# Patient Record
Sex: FEMALE | Race: WHITE | NOT HISPANIC OR LATINO | Employment: FULL TIME | URBAN - METROPOLITAN AREA
[De-identification: names, ages, dates, MRNs, and addresses within clinical notes are randomized per-mention and may not be internally consistent; named-entity substitution may affect disease eponyms.]

---

## 2017-01-03 ENCOUNTER — PRENATAL OFFICE VISIT - HEALTHEAST (OUTPATIENT)
Dept: MIDWIFE SERVICES | Facility: CLINIC | Age: 32
End: 2017-01-03

## 2017-01-03 DIAGNOSIS — Z34.83 ENCOUNTER FOR SUPERVISION OF OTHER NORMAL PREGNANCY, THIRD TRIMESTER: ICD-10-CM

## 2017-01-03 ASSESSMENT — MIFFLIN-ST. JEOR: SCORE: 1636.17

## 2017-01-12 ENCOUNTER — HOME CARE/HOSPICE - HEALTHEAST (OUTPATIENT)
Dept: HOME HEALTH SERVICES | Facility: HOME HEALTH | Age: 32
End: 2017-01-12

## 2017-01-13 ENCOUNTER — COMMUNICATION - HEALTHEAST (OUTPATIENT)
Dept: OBGYN | Facility: CLINIC | Age: 32
End: 2017-01-13

## 2017-01-16 ENCOUNTER — COMMUNICATION - HEALTHEAST (OUTPATIENT)
Dept: ADMINISTRATIVE | Facility: CLINIC | Age: 32
End: 2017-01-16

## 2017-01-17 ENCOUNTER — OFFICE VISIT - HEALTHEAST (OUTPATIENT)
Dept: MIDWIFE SERVICES | Facility: CLINIC | Age: 32
End: 2017-01-17

## 2017-01-17 DIAGNOSIS — N89.8 VAGINAL ODOR: ICD-10-CM

## 2017-01-17 DIAGNOSIS — Z78.9 BREASTFEEDING (INFANT): ICD-10-CM

## 2017-01-17 DIAGNOSIS — B99.9 INFECTION: ICD-10-CM

## 2017-01-17 ASSESSMENT — MIFFLIN-ST. JEOR: SCORE: 1542.28

## 2017-01-18 ENCOUNTER — COMMUNICATION - HEALTHEAST (OUTPATIENT)
Dept: ADMINISTRATIVE | Facility: CLINIC | Age: 32
End: 2017-01-18

## 2017-01-18 ENCOUNTER — AMBULATORY - HEALTHEAST (OUTPATIENT)
Dept: MIDWIFE SERVICES | Facility: CLINIC | Age: 32
End: 2017-01-18

## 2017-01-18 ENCOUNTER — COMMUNICATION - HEALTHEAST (OUTPATIENT)
Dept: OBGYN | Facility: CLINIC | Age: 32
End: 2017-01-18

## 2017-01-23 ENCOUNTER — COMMUNICATION - HEALTHEAST (OUTPATIENT)
Dept: OBGYN | Facility: CLINIC | Age: 32
End: 2017-01-23

## 2017-02-20 ENCOUNTER — OFFICE VISIT - HEALTHEAST (OUTPATIENT)
Dept: MIDWIFE SERVICES | Facility: CLINIC | Age: 32
End: 2017-02-20

## 2017-02-20 DIAGNOSIS — R58 BLEEDING: ICD-10-CM

## 2017-02-20 ASSESSMENT — MIFFLIN-ST. JEOR: SCORE: 1549.53

## 2017-02-23 LAB
HPV INTERPRETATION - HISTORICAL: ABNORMAL
HPV INTERPRETER - HISTORICAL: ABNORMAL

## 2017-02-24 ENCOUNTER — AMBULATORY - HEALTHEAST (OUTPATIENT)
Dept: OBGYN | Facility: CLINIC | Age: 32
End: 2017-02-24

## 2017-02-24 LAB
BKR LAB AP ABNORMAL BLEEDING: NO
BKR LAB AP BIRTH CONTROL/HORMONES: NORMAL
BKR LAB AP CERVICAL APPEARANCE: NORMAL
BKR LAB AP GYN ADEQUACY: NORMAL
BKR LAB AP GYN INTERPRETATION: NORMAL
BKR LAB AP HPV REFLEX: NORMAL
BKR LAB AP LMP: NORMAL
BKR LAB AP PATIENT STATUS: NORMAL
BKR LAB AP PREVIOUS ABNORMAL: NORMAL
BKR LAB AP PREVIOUS NORMAL: NORMAL
HIGH RISK?: YES
PATH REPORT.COMMENTS IMP SPEC: NORMAL
RESULT FLAG (HE HISTORICAL CONVERSION): NORMAL

## 2017-02-27 ENCOUNTER — COMMUNICATION - HEALTHEAST (OUTPATIENT)
Dept: MIDWIFE SERVICES | Facility: CLINIC | Age: 32
End: 2017-02-27

## 2017-03-06 ENCOUNTER — TRANSFERRED RECORDS (OUTPATIENT)
Dept: HEALTH INFORMATION MANAGEMENT | Facility: CLINIC | Age: 32
End: 2017-03-06

## 2017-03-06 LAB — PAP SMEAR - HIM PATIENT REPORTED: NEGATIVE

## 2017-04-07 ENCOUNTER — OFFICE VISIT (OUTPATIENT)
Dept: FAMILY MEDICINE | Facility: CLINIC | Age: 32
End: 2017-04-07
Payer: COMMERCIAL

## 2017-04-07 VITALS
HEART RATE: 101 BPM | BODY MASS INDEX: 29.89 KG/M2 | RESPIRATION RATE: 20 BRPM | HEIGHT: 66 IN | TEMPERATURE: 99.3 F | SYSTOLIC BLOOD PRESSURE: 119 MMHG | WEIGHT: 186 LBS | DIASTOLIC BLOOD PRESSURE: 77 MMHG | OXYGEN SATURATION: 98 %

## 2017-04-07 DIAGNOSIS — J06.9 VIRAL URI: Primary | ICD-10-CM

## 2017-04-07 PROCEDURE — 99203 OFFICE O/P NEW LOW 30 MIN: CPT | Performed by: FAMILY MEDICINE

## 2017-04-07 RX ORDER — OXYMETAZOLINE HYDROCHLORIDE 0.05 G/100ML
2-3 SPRAY NASAL 2 TIMES DAILY PRN
Qty: 1 BOTTLE | Refills: 0 | Status: SHIPPED | OUTPATIENT
Start: 2017-04-07 | End: 2017-09-01

## 2017-04-07 NOTE — Clinical Note
Please abstract the following data from this visit with this patient into the appropriate field in Epic:  Pap smear done on this date: 3/6/17 (approximately), by this group: [facility], results were normal.

## 2017-04-07 NOTE — PROGRESS NOTES
"  SUBJECTIVE:                                                    Bre Saleh is a 32 year old female who presents to clinic today for the following health issues:      Acute Illness   Acute illness concerns: sinus infection  Onset: 5 days    Fever: YES    Chills/Sweats: YES    Headache (location?): YES    Sinus Pressure:YES- post-nasal drainage and facial pain    Conjunctivitis:  YES: bilateral    Ear Pain: YES: right    Rhinorrhea: YES    Congestion: YES    Sore Throat: YES     Cough: YES-productive of yellow sputum    Wheeze: YES    Decreased Appetite: YES    Nausea: YES    Vomiting: no    Diarrhea:  no    Dysuria/Freq.: no    Fatigue/Achiness: YES    Sick/Strep Exposure: YES     Therapies Tried and outcome: OTC medicine.          Problem list and histories reviewed & adjusted, as indicated.  Additional history: as documented    There is no problem list on file for this patient.    No past surgical history on file.    Social History   Substance Use Topics     Smoking status: Never Smoker     Smokeless tobacco: Never Used     Alcohol use No     History reviewed. No pertinent family history.      Current Outpatient Prescriptions   Medication Sig Dispense Refill     oxymetazoline (AFRIN NASAL SPRAY) 0.05 % spray Spray 2-3 sprays into both nostrils 2 times daily as needed for congestion 1 Bottle 0     No Known Allergies    Reviewed and updated as needed this visit by clinical staff       Reviewed and updated as needed this visit by Provider         ROS:  C: NEGATIVE for fever, chills, change in weight  CV: NEGATIVE for chest pain, palpitations or peripheral edema    OBJECTIVE:                                                    /77 (BP Location: Right arm, Patient Position: Chair, Cuff Size: Adult Regular)  Pulse 101  Temp 99.3  F (37.4  C) (Oral)  Resp 20  Ht 5' 6\" (1.676 m)  Wt 186 lb (84.4 kg)  SpO2 98%  BMI 30.02 kg/m2  Body mass index is 30.02 kg/(m^2).  Head: Normocephalic, atraumatic.  Eyes: " Conjunctiva clear, non icteric. PERRLA.  Ears: External ears nl, TM is nl   Nose: Septum midline, nasal mucosa congested. No discharge.  There is no tenderness over the maxillary sinuses, there is no tenderness over the frontal sinuses  Mouth / Throat: Normal dentition.  No oral lesions. Pharynx mild erythematous, tonsils no exudate/hypertrophy.  Neck: Supple, no enlarged LN, trachea midline.  LUNGS:  CTA B/L, no wheezing or crackles.  CVS : RRR, no murmur, no rub.             ASSESSMENT/PLAN:                                                            1. Viral URI  With significant congestion, start on   - oxymetazoline (AFRIN NASAL SPRAY) 0.05 % spray; Spray 2-3 sprays into both nostrils 2 times daily as needed for congestion  Dispense: 1 Bottle; Refill: 0    Follow up in 5 days if symptoms persist, sooner if symptoms worsen or new ones develops, pt may contact us over the phone for any questions or concerns.      Renuka Amzecua MD  [facility]

## 2017-04-07 NOTE — NURSING NOTE
"Chief Complaint   Patient presents with     New Patient     Sinus Problem     sore throat, wheezing, cough x5 days       Initial /77 (BP Location: Right arm, Patient Position: Chair, Cuff Size: Adult Regular)  Pulse 101  Temp 99.3  F (37.4  C) (Oral)  Resp 20  Ht 5' 6\" (1.676 m)  Wt 186 lb (84.4 kg)  SpO2 98%  BMI 30.02 kg/m2 Estimated body mass index is 30.02 kg/(m^2) as calculated from the following:    Height as of this encounter: 5' 6\" (1.676 m).    Weight as of this encounter: 186 lb (84.4 kg).  Medication Reconciliation: complete   Renetta Teague, WILSON      "

## 2017-04-07 NOTE — MR AVS SNAPSHOT
"              After Visit Summary   2017    Bre Saleh    MRN: [id number]           Patient Information     Date Of Birth          1985        Visit Information        Provider Department      2017 1:15 PM Renuka Amezcua MD [facility]        Today's Diagnoses     Viral URI    -  1       Follow-ups after your visit        Who to contact     If you have questions or need follow up information about today's clinic visit or your schedule please contact [facility] directly at [phone].  Normal or non-critical lab and imaging results will be communicated to you by Hornet [employer]hart, letter or phone within 4 business days after the clinic has received the results. If you do not hear from us within 7 days, please contact the clinic through [employer] or phone. If you have a critical or abnormal lab result, we will notify you by phone as soon as possible.  Submit refill requests through Colto or call your pharmacy and they will forward the refill request to us. Please allow 3 business days for your refill to be completed.          Additional Information About Your Visit        MyChart Information     Colto lets you send messages to your doctor, view your test results, renew your prescriptions, schedule appointments and more. To sign up, go to [email or web address] . Click on \"Log in\" on the left side of the screen, which will take you to the Welcome page. Then click on \"Sign up Now\" on the right side of the page.     You will be asked to enter the access code listed below, as well as some personal information. Please follow the directions to create your username and password.     Your access code is: [email or web address]  Expires: 2017  1:38 PM     Your access code will  in 90 days. If you need help or a new code, please call your [facility] or [phone].        Care EveryWhere ID     This is your Care EveryWhere ID. This could be used by other organizations to " "access your [place] medical records  [id number]        Your Vitals Were     Pulse Temperature Respirations Height Pulse Oximetry BMI (Body Mass Index)    101 99.3  F (37.4  C) (Oral) 20 5' 6\" (1.676 m) 98% 30.02 kg/m2       Blood Pressure from Last 3 Encounters:   04/07/17 119/77    Weight from Last 3 Encounters:   04/07/17 186 lb (84.4 kg)              Today, you had the following     No orders found for display         Today's Medication Changes          These changes are accurate as of: 4/7/17  1:38 PM.  If you have any questions, ask your nurse or doctor.               Start taking these medicines.        Dose/Directions    oxymetazoline 0.05 % spray   Commonly known as:  AFRIN NASAL SPRAY   Used for:  Viral URI   Started by:  Renuka Amezcua MD        Dose:  2-3 spray   Spray 2-3 sprays into both nostrils 2 times daily as needed for congestion   Quantity:  1 Bottle   Refills:  0            Where to get your medicines      These medications were sent to [facility] [place] [id number]     Phone:  [phone]     oxymetazoline 0.05 % spray                Primary Care Provider Office Phone # Fax #    Renuka Amezcua [phone]       [address] [address]        Thank you!     Thank you for choosing [facility]  for your care. Our goal is always to provide you with excellent care. Hearing back from our patients is one way we can continue to improve our services. Please take a few minutes to complete the written survey that you may receive in the mail after your visit with us. Thank you!             Your Updated Medication List - Protect others around you: Learn how to safely use, store and throw away your medicines at www.disposemymeds.org.          This list is accurate as of: 4/7/17  1:38 PM.  Always use your most recent med list.                   Brand Name Dispense " Instructions for use    oxymetazoline 0.05 % spray    AFRIN NASAL SPRAY    1 Bottle    Spray 2-3 sprays into both nostrils 2 times daily as needed for congestion

## 2017-09-01 ENCOUNTER — THERAPY VISIT (OUTPATIENT)
Dept: PHYSICAL THERAPY | Facility: CLINIC | Age: 32
End: 2017-09-01
Payer: COMMERCIAL

## 2017-09-01 ENCOUNTER — OFFICE VISIT (OUTPATIENT)
Dept: FAMILY MEDICINE | Facility: CLINIC | Age: 32
End: 2017-09-01
Payer: COMMERCIAL

## 2017-09-01 VITALS
HEIGHT: 66 IN | TEMPERATURE: 98.3 F | BODY MASS INDEX: 29.09 KG/M2 | WEIGHT: 181 LBS | OXYGEN SATURATION: 97 % | SYSTOLIC BLOOD PRESSURE: 114 MMHG | DIASTOLIC BLOOD PRESSURE: 76 MMHG | RESPIRATION RATE: 16 BRPM | HEART RATE: 72 BPM
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
## 2017-09-01 DIAGNOSIS — S16.1XXD CERVICAL STRAIN, SUBSEQUENT ENCOUNTER: Primary | ICD-10-CM

## 2017-09-01 DIAGNOSIS — S13.9XXA SPRAIN OF NECK, INITIAL ENCOUNTER: Primary | ICD-10-CM

## 2017-09-01 PROCEDURE — 97110 THERAPEUTIC EXERCISES: CPT | Mod: GP | Performed by: PHYSICAL THERAPIST

## 2017-09-01 PROCEDURE — 99214 OFFICE O/P EST MOD 30 MIN: CPT | Performed by: FAMILY MEDICINE

## 2017-09-01 PROCEDURE — 97161 PT EVAL LOW COMPLEX 20 MIN: CPT | Mod: GP | Performed by: PHYSICAL THERAPIST
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
## 2017-09-01 NOTE — MR AVS SNAPSHOT
After Visit Summary   9/1/2017    Bre Saleh    MRN: [id number]           Patient Information     Date Of Birth          1985        Visit Information        Provider Department      9/1/2017 10:50 AM Eden Westfall, [facility] Physical Therapy        Today's Diagnoses     Cervical strain, subsequent encounter    -  1       Follow-ups after your visit        Your next 10 appointments already scheduled     Sep 08, 2017  9:30 AM CDT   ABBIE Spine with [facility] Westfall PT   [facility] ([facility])    [address]  [facility] [address]   [phone]            Sep 15, 2017 10:10 AM CDT   ABBIE Spine with [facility] Westfall PT   [facility] ([facility])    [address]  [facility] [address]   [phone]            Sep 22, 2017 10:10 AM CDT   ABBIE Spine with Eden Westfall PT   [facility] Physical Therapy ([facility])    [address]  [facility] [address]   [phone]            Sep 29, 2017 10:10 AM CDT   ABBIE Spine with Eden Westfall PT   [facility] Physical Therapy ([facility])    [address]  [facility] [address] [phone]              Who to contact     If you have questions or need follow up information about today's clinic visit or your schedule please contact [facility] PHYSICAL THERAPY directly at [phone].  Normal or non-critical lab and imaging results will be communicated to you by MyChart, letter or phone within 4 business days after the clinic has received the results. If you do not hear from us within 7 days, please contact the clinic through MyChart or phone. If you have a critical or abnormal lab result, we will notify you by phone as soon as  "possible.  Submit refill requests through [employer] Grid or call your pharmacy and they will forward the refill request to us. Please allow 3 business days for your refill to be completed.          Additional Information About Your Visit        CloudVerticalharTansler Information     [employer] Grid lets you send messages to your doctor, view your test results, renew your prescriptions, schedule appointments and more. To sign up, go to [email or web address] . Click on \"Log in\" on the left side of the screen, which will take you to the Welcome page. Then click on \"Sign up Now\" on the right side of the page.     You will be asked to enter the access code listed below, as well as some personal information. Please follow the directions to create your username and password.     Your access code is: [email or web address]  Expires: 2017 10:31 AM     Your access code will  in 90 days. If you need help or a new code, please call your [place] clinic or [phone].        Care EveryWhere ID     This is your Care EveryWhere ID. This could be used by other organizations to access your [place] medical records  [id number]         Blood Pressure from Last 3 Encounters:   17 114/76   17 119/77    Weight from Last 3 Encounters:   17 82.1 kg (181 lb)   17 84.4 kg (186 lb)              We Performed the Following     HC PT EVAL, LOW COMPLEXITY     ABBIE INITIAL EVAL REPORT     THERAPEUTIC EXERCISES        Primary Care Provider Office Phone # Fax #    Renuka Amezcua [phone] [address] [facility] [address]        Equal Access to Services     [facility]: Hadii aad ku hadasho Soomaali, waaxda luqadaha, qaybta kaalmada jos abrams. So [facility] [phone].    ATENCIÓN: Si habla español, tiene a keating disposición servicios gratuitos de asistencia lingüística. Llame al [phone].    We comply with applicable federal civil rights laws and [place] laws. We do not " discriminate on the basis of race, color, national origin, age, disability sex, sexual orientation or gender identity.            Thank you!     Thank you for choosing [facility] PHYSICAL THERAPY  for your care. Our goal is always to provide you with excellent care. Hearing back from our patients is one way we can continue to improve our services. Please take a few minutes to complete the written survey that you may receive in the mail after your visit with us. Thank you!             Your Updated Medication List - Protect others around you: Learn how to safely use, store and throw away your medicines at www.disposemymeds.org.          This list is accurate as of: 9/1/17 11:30 AM.  Always use your most recent med list.                   Brand Name Dispense Instructions for use Diagnosis    MULTIVITAMIN PO
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
## 2017-09-08 ENCOUNTER — THERAPY VISIT (OUTPATIENT)
Dept: PHYSICAL THERAPY | Facility: CLINIC | Age: 32
End: 2017-09-08
Payer: COMMERCIAL

## 2017-09-08 DIAGNOSIS — S16.1XXD CERVICAL STRAIN, SUBSEQUENT ENCOUNTER: ICD-10-CM

## 2017-09-08 PROCEDURE — 97110 THERAPEUTIC EXERCISES: CPT | Mod: GP | Performed by: PHYSICAL THERAPIST

## 2017-09-08 PROCEDURE — 97140 MANUAL THERAPY 1/> REGIONS: CPT | Mod: GP | Performed by: PHYSICAL THERAPIST

## 2017-09-08 NOTE — PROGRESS NOTES
Subjective:    HPI                    Objective:    System    Physical Exam    General     ROS    Assessment/Plan:      SUBJECTIVE  Subjective changes as noted by pt:   Neck pain is much better.  No longer needing ice or Ibuprofen.    Current pain level: 1/10   Changes in function:  Yes (See Goal flowsheet attached for changes in current functional level)     Adverse reaction to treatment or activity:  None    OBJECTIVE  Changes in objective findings:  Yes, CROM WNL in all directions. Fair scap control. Prone shoulder extension grade 4-/5 B. Moderate bilateral suboccipital and CPVM.      ASSESSMENT  Bre continues to require intervention to meet STG and LTG's: PT  Patient's symptoms are resolving.  Patient is ready to progress to more complex exercises.  Response to therapy has shown an improvement in  pain level, ROM , flexibility and function  Progress made towards STG/LTG?  Yes (See Goal flowsheet attached for updates on achievement of STG and LTG)    PLAN  Current treatment program is being advanced to more complex exercises.    PTA/ATC plan:  N/A    Please refer to the daily flowsheet for treatment today, total treatment time and time spent performing 1:1 timed codes.

## 2017-09-08 NOTE — MR AVS SNAPSHOT
After Visit Summary   9/8/2017    Bre Saleh    MRN: [id number]           Patient Information     Date Of Birth          1985        Visit Information        Provider Department      9/8/2017 9:30 AM Eden Westfall PT [facility] Physical Therapy        Today's Diagnoses     Cervical strain, subsequent encounter           Follow-ups after your visit        Your next 10 appointments already scheduled     Sep 15, 2017 10:10 AM CDT   ABBIE Spine with Eden Westfall PT   [facility] Physical Therapy ([facility])    [address]  [facility] [address]   [phone]            Sep 22, 2017 10:10 AM CDT   ABBIE Spine with Eden Westfall PT   [facility] ([facility])    [address]  [facility] [address]   [phone]            Sep 29, 2017 10:10 AM CDT   ABBIE Spine with Eden Westfall PT   [facility] ([facility])    [address]  [facility] [address]   [phone]              Who to contact     If you have questions or need follow up information about today's clinic visit or your schedule please contact [facility] PHYSICAL THERAPY directly at [phone].  Normal or non-critical lab and imaging results will be communicated to you by MyChart, letter or phone within 4 business days after the clinic has received the results. If you do not hear from us within 7 days, please contact the clinic through MyChart or phone. If you have a critical or abnormal lab result, we will notify you by phone as soon as possible.  Submit refill requests through Intermolecular or call your pharmacy and they will forward the refill request to us. Please allow 3 business days for your refill to be completed.          Additional Information About Your Visit       "  MyChart Information     Client Outlook lets you send messages to your doctor, view your test results, renew your prescriptions, schedule appointments and more. To sign up, go to [email or web address] . Click on \"Log in\" on the left side of the screen, which will take you to the Welcome page. Then click on \"Sign up Now\" on the right side of the page.     You will be asked to enter the access code listed below, as well as some personal information. Please follow the directions to create your username and password.     Your access code is: [email or web address]  Expires: 2017 10:31 AM     Your access code will  in 90 days. If you need help or a new code, please call your [facility] clinic or [phone].        Care EveryWhere ID     This is your Care EveryWhere ID. This could be used by other organizations to access your [facility] medical records  [id number]         Blood Pressure from Last 3 Encounters:   17 114/76   17 119/77    Weight from Last 3 Encounters:   17 82.1 kg (181 lb)   17 84.4 kg (186 lb)              We Performed the Following     MANUAL THER TECH,1+REGIONS,EA 15 MIN     THERAPEUTIC EXERCISES        Primary Care Provider Office Phone # Fax #    Renuka Amezcua [phone] [address] [facility] [address]        Equal Access to Services     [facility] : Hadii aad ku hadasho Soomaali, waaxda luqadaha, qaybta kaalmada adeegyada, jos oliveira . So [facility] [phone].    ATENCIÓN: Si habla español, tiene a keating disposición servicios gratuitos de asistencia lingüística. Llame al [phone].    We comply with applicable federal civil rights laws and [place] laws. We do not discriminate on the basis of race, color, national origin, age, disability sex, sexual orientation or gender identity.            Thank you!     Thank you for choosing [facility] PH[facility]ICAL THERAP[facility]  for your care. Our goal is " always to provide you with excellent care. Hearing back from our patients is one way we can continue to improve our services. Please take a few minutes to complete the written survey that you may receive in the mail after your visit with us. Thank you!             Your Updated Medication List - Protect others around you: Learn how to safely use, store and throw away your medicines at www.disposemymeds.org.          This list is accurate as of: 9/8/17 10:04 AM.  Always use your most recent med list.                   Brand Name Dispense Instructions for use Diagnosis    MULTIVITAMIN PO

## 2017-09-15 ENCOUNTER — THERAPY VISIT (OUTPATIENT)
Dept: PHYSICAL THERAPY | Facility: CLINIC | Age: 32
End: 2017-09-15
Payer: COMMERCIAL

## 2017-09-15 DIAGNOSIS — S16.1XXD CERVICAL STRAIN, SUBSEQUENT ENCOUNTER: ICD-10-CM

## 2017-09-15 PROCEDURE — 97140 MANUAL THERAPY 1/> REGIONS: CPT | Mod: GP | Performed by: PHYSICAL THERAPIST

## 2017-09-15 PROCEDURE — 97110 THERAPEUTIC EXERCISES: CPT | Mod: GP | Performed by: PHYSICAL THERAPIST

## 2017-09-15 NOTE — PROGRESS NOTES
Subjective:    HPI                    Objective:    System    Physical Exam    General     ROS    Assessment/Plan:      SUBJECTIVE  Subjective changes as noted by pt:   Limited exercises this week due to being busy.    Current pain level:  0/10   Changes in function:  Yes (See Goal flowsheet attached for changes in current functional level)     Adverse reaction to treatment or activity:  None    OBJECTIVE  Changes in objective findings:  Yes, CROM WNL in all direction except SB 80% bilaterally. Minimal CPVM hypertonicity. Fair to good scap control. Prone shoulder extension grade 4/5.     ASSESSMENT  Bre continues to require intervention to meet STG and LTG's: PT  Patient's symptoms are resolving.  Response to therapy has shown an improvement in  pain level, strength and function  Progress made towards STG/LTG?  Yes (See Goal flowsheet attached for updates on achievement of STG and LTG)    PLAN  Current treatment program is being advanced to more complex exercises.    PTA/ATC plan:  N/A    Please refer to the daily flowsheet for treatment today, total treatment time and time spent performing 1:1 timed codes.

## 2017-09-15 NOTE — MR AVS SNAPSHOT
"              After Visit Summary   9/15/2017    Bre Saleh    MRN: [id number]           Patient Information     Date Of Birth          1985        Visit Information        Provider Department      9/15/2017 10:10 AM Eden Westfall PT [facility] Physical Therapy        Today's Diagnoses     Cervical strain, subsequent encounter           Follow-ups after your visit        Your next 10 appointments already scheduled     Sep 22, 2017 10:10 AM CDT   ABBIE Spine with Eden Westfall PT   [facility] Physical Therapy ([facility])    [address]  [facility] [address] [phone]            Sep 29, 2017 10:10 AM CDT   ABBIE Spine with Eden Westfall PT   [facility] Physical Therapy ([facility])    [address]  [facility] [address] [phone]              Who to contact     If you have questions or need follow up information about today's clinic visit or your schedule please contact [facility] directly at [phone].  Normal or non-critical lab and imaging results will be communicated to you by [employer]hart, letter or phone within 4 business days after the clinic has received the results. If you do not hear from us within 7 days, please contact the clinic through Fanplayrt or phone. If you have a critical or abnormal lab result, we will notify you by phone as soon as possible.  Submit refill requests through [email or web address] or call your pharmacy and they will forward the refill request to us. Please allow 3 business days for your refill to be completed.          Additional Information About Your Visit        MyChart Information     [email or web address] lets you send messages to your doctor, view your test results, renew your prescriptions, schedule appointments and more. To sign up, go to [email or web address] . Click on \"Log in\" on the " "left side of the screen, which will take you to the Welcome page. Then click on \"Sign up Now\" on the right side of the page.     You will be asked to enter the access code listed below, as well as some personal information. Please follow the directions to create your username and password.     Your access code is: [email or web address]  Expires: 2017 10:31 AM     Your access code will  in 90 days. If you need help or a new code, please call your [place] clinic or [phone].        Care EveryWhere ID     This is your Care EveryWhere ID. This could be used by other organizations to access your [place] medical records  [id number]         Blood Pressure from Last 3 Encounters:   17 114/76   17 119/77    Weight from Last 3 Encounters:   17 82.1 kg (181 lb)   17 84.4 kg (186 lb)              We Performed the Following     MANUAL THER TECH,1+REGIONS,EA 15 MIN     THERAPEUTIC EXERCISES        Primary Care Provider Office Phone # Fax #    Renuka Amezcua [phone] [address] [facility] [address]        Equal Access to Services     TAMMY BRADSHAW : Hadii patience rileyo Soteresa, waaxda lukeyanaadaha, qaybta kaalmada adeanupamada, jos casey. So [facility] [phone].    ATENCIÓN: Si habla español, tiene a keating disposición servicios gratuitos de asistencia lingüística. Llame al [phone].    We comply with applicable federal civil rights laws and [place] laws. We do not discriminate on the basis of race, color, national origin, age, disability sex, sexual orientation or gender identity.            Thank you!     Thank you for choosing [facility] PHYSICAL THERAP[facility]  for your care. Our goal is always to provide you with excellent care. Hearing back from our patients is one way we can continue to improve our services. Please take a few minutes to complete the written survey that you may receive in the mail after your " visit with us. Thank you!             Your Updated Medication List - Protect others around you: Learn how to safely use, store and throw away your medicines at www.disposemymeds.org.          This list is accurate as of: 9/15/17 10:55 AM.  Always use your most recent med list.                   Brand Name Dispense Instructions for use Diagnosis    MULTIVITAMIN PO

## 2017-09-22 ENCOUNTER — THERAPY VISIT (OUTPATIENT)
Dept: PHYSICAL THERAPY | Facility: CLINIC | Age: 32
End: 2017-09-22
Payer: COMMERCIAL

## 2017-09-22 DIAGNOSIS — S16.1XXD CERVICAL STRAIN, SUBSEQUENT ENCOUNTER: ICD-10-CM

## 2017-09-22 PROCEDURE — 97140 MANUAL THERAPY 1/> REGIONS: CPT | Mod: GP | Performed by: PHYSICAL THERAPIST

## 2017-09-22 PROCEDURE — 97110 THERAPEUTIC EXERCISES: CPT | Mod: GP | Performed by: PHYSICAL THERAPIST

## 2017-09-22 NOTE — PROGRESS NOTES
Subjective:    HPI                    Objective:    System    Physical Exam    General     ROS    Assessment/Plan:      SUBJECTIVE  Subjective changes as noted by pt:   Neck is feeling good.    Current pain level: 0/10   Changes in function:  Yes (See Goal flowsheet attached for changes in current functional level)     Adverse reaction to treatment or activity:  None    OBJECTIVE  Changes in objective findings:  Yes,  CROM WNL in all directions. Minimal R CPVM tone. Good scap control. Bilateral prone arm raise grade 5-/5.  Bilateral shoulder scaption grade 4+/5 with some UT activation with active scaption. Good posture.    ASSESSMENT  Bre continues to require intervention to meet STG and LTG's: PT  Patient's symptoms are resolving.  Patient is ready to progress to more complex exercises.  Response to therapy has shown an improvement in  pain level, ROM , strength, muscle control, posture and function  Progress made towards STG/LTG?  Yes (See Goal flowsheet attached for updates on achievement of STG and LTG)    PLAN  Current treatment program is being advanced to more complex exercises.    PTA/ATC plan:  N/A    Please refer to the daily flowsheet for treatment today, total treatment time and time spent performing 1:1 timed codes.

## 2017-09-22 NOTE — MR AVS SNAPSHOT
"              After Visit Summary   9/22/2017    Bre Saleh    MRN: [id number]           Patient Information     Date Of Birth          1985        Visit Information        Provider Department      9/22/2017 10:10 AM Eden Westfall PT [facility] Physical Therapy        Today's Diagnoses     Cervical strain, subsequent encounter           Follow-ups after your visit        Your next 10 appointments already scheduled     Sep 29, 2017 10:10 AM CDT   ABBIE Spine with Eden Westfall PT   [facility] ([facility])    [address]  [facility] [address] [phone]              Who to contact     If you have questions or need follow up information about today's clinic visit or your schedule please contact [facility] PHYSICAL THERAPY directly at [phone].  Normal or non-critical lab and imaging results will be communicated to you by MyChart, letter or phone within 4 business days after the clinic has received the results. If you do not hear from us within 7 days, please contact the clinic through Italia Onlinehart or phone. If you have a critical or abnormal lab result, we will notify you by phone as soon as possible.  Submit refill requests through Flexiroam or call your pharmacy and they will forward the refill request to us. Please allow 3 business days for your refill to be completed.          Additional Information About Your Visit        MyChart Information     Flexiroam lets you send messages to your doctor, view your test results, renew your prescriptions, schedule appointments and more. To sign up, go to [email or web address] . Click on \"Log in\" on the left side of the screen, which will take you to the Welcome page. Then click on \"Sign up Now\" on the right side of the page.     You will be asked to enter the access code listed below, as well as some personal information. " Please follow the directions to create your username and password.     Your access code is: [email or web address]  Expires: 2017 10:31 AM     Your access code will  in 90 days. If you need help or a new code, please call your [place] clinic or [phone].        Care EveryWhere ID     This is your Care EveryWhere ID. This could be used by other organizations to access your [place] medical records  [id number]         Blood Pressure from Last 3 Encounters:   17 114/76   17 119/77    Weight from Last 3 Encounters:   17 82.1 kg (181 lb)   17 84.4 kg (186 lb)              We Performed the Following     MANUAL THER TECH,1+REGIONS,EA 15 MIN     THERAPEUTIC EXERCISES        Primary Care Provider Office Phone # Fax #    Renuka Amezcua [phone] [address] [facility] [address]        Equal Access to Services     [facility]: Hadii aad ku hadasho Soomaali, waaxda luqadaha, qaybta kaalmada adeegyada, waxay idiin hayaan daly bargeraraoralia oliveira . So [facility] [phone].    ATENCIÓN: Si habla español, tiene a keating disposición servicios gratuitos de asistencia lingüística. Llame al [phone].    We comply with applicable federal civil rights laws and [place] laws. We do not discriminate on the basis of race, color, national origin, age, disability sex, sexual orientation or gender identity.            Thank you!     Thank you for choosing [facility] PHYSICAL THERAP[facility]  for your care. Our goal is always to provide you with excellent care. Hearing back from our patients is one way we can continue to improve our services. Please take a few minutes to complete the written survey that you may receive in the mail after your visit with us. Thank you!             Your Updated Medication List - Protect others around you: Learn how to safely use, store and throw away your medicines at www.disposemymeds.org.          This list is accurate as of:  9/22/17 10:42 AM.  Always use your most recent med list.                   Brand Name Dispense Instructions for use Diagnosis    MULTIVITAMIN PO

## 2017-11-01 PROBLEM — S16.1XXD CERVICAL STRAIN, SUBSEQUENT ENCOUNTER: Status: RESOLVED | Noted: 2017-09-01 | Resolved: 2017-11-01
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
## 2019-10-08 ASSESSMENT — MIFFLIN-ST. JEOR: SCORE: 1644.36

## 2019-10-10 ENCOUNTER — ANESTHESIA EVENT (OUTPATIENT)
Dept: SURGERY | Facility: CLINIC | Age: 34
End: 2019-10-10
Payer: COMMERCIAL

## 2019-10-10 ENCOUNTER — ANESTHESIA (OUTPATIENT)
Dept: SURGERY | Facility: CLINIC | Age: 34
End: 2019-10-10
Payer: COMMERCIAL

## 2019-10-10 ENCOUNTER — HOSPITAL ENCOUNTER (OUTPATIENT)
Facility: CLINIC | Age: 34
Discharge: HOME | End: 2019-10-10
Attending: OBSTETRICS & GYNECOLOGY | Admitting: OBSTETRICS & GYNECOLOGY
Payer: COMMERCIAL

## 2019-10-10 ENCOUNTER — HOSPITAL ENCOUNTER (OUTPATIENT)
Dept: ULTRASOUND IMAGING | Facility: CLINIC | Age: 34
End: 2019-10-10
Attending: OBSTETRICS & GYNECOLOGY | Admitting: OBSTETRICS & GYNECOLOGY
Payer: COMMERCIAL

## 2019-10-10 VITALS
HEART RATE: 60 BPM | SYSTOLIC BLOOD PRESSURE: 99 MMHG | BODY MASS INDEX: 31.55 KG/M2 | HEIGHT: 67 IN | WEIGHT: 201 LBS | DIASTOLIC BLOOD PRESSURE: 63 MMHG | OXYGEN SATURATION: 99 % | TEMPERATURE: 97.2 F | RESPIRATION RATE: 14 BRPM

## 2019-10-10 DIAGNOSIS — Z98.890 S/P DILATATION AND CURETTAGE: Primary | ICD-10-CM

## 2019-10-10 DIAGNOSIS — O02.9: ICD-10-CM

## 2019-10-10 PROCEDURE — 71000027 ZZH RECOVERY PHASE 2 EACH 15 MINS: Performed by: OBSTETRICS & GYNECOLOGY

## 2019-10-10 PROCEDURE — 36000052 ZZH SURGERY LEVEL 2 EA 15 ADDTL MIN: Performed by: OBSTETRICS & GYNECOLOGY

## 2019-10-10 PROCEDURE — 27210794 ZZH OR GENERAL SUPPLY STERILE: Performed by: OBSTETRICS & GYNECOLOGY

## 2019-10-10 PROCEDURE — 88305 TISSUE EXAM BY PATHOLOGIST: CPT | Performed by: OBSTETRICS & GYNECOLOGY

## 2019-10-10 PROCEDURE — 25000125 ZZHC RX 250: Performed by: ANESTHESIOLOGY

## 2019-10-10 PROCEDURE — 25000128 H RX IP 250 OP 636: Performed by: NURSE ANESTHETIST, CERTIFIED REGISTERED

## 2019-10-10 PROCEDURE — 25800030 ZZH RX IP 258 OP 636: Performed by: ANESTHESIOLOGY

## 2019-10-10 PROCEDURE — 25000125 ZZHC RX 250: Performed by: NURSE ANESTHETIST, CERTIFIED REGISTERED

## 2019-10-10 PROCEDURE — 40000306 ZZH STATISTIC PRE PROC ASSESS II: Performed by: OBSTETRICS & GYNECOLOGY

## 2019-10-10 PROCEDURE — 71000012 ZZH RECOVERY PHASE 1 LEVEL 1 FIRST HR: Performed by: OBSTETRICS & GYNECOLOGY

## 2019-10-10 PROCEDURE — 25000125 ZZHC RX 250: Performed by: OBSTETRICS & GYNECOLOGY

## 2019-10-10 PROCEDURE — 00000159 ZZHCL STATISTIC H-SEND OUTS PREP: Performed by: OBSTETRICS & GYNECOLOGY

## 2019-10-10 PROCEDURE — 40000985 US INTRAOPERATIVE

## 2019-10-10 PROCEDURE — 37000008 ZZH ANESTHESIA TECHNICAL FEE, 1ST 30 MIN: Performed by: OBSTETRICS & GYNECOLOGY

## 2019-10-10 PROCEDURE — 37000009 ZZH ANESTHESIA TECHNICAL FEE, EACH ADDTL 15 MIN: Performed by: OBSTETRICS & GYNECOLOGY

## 2019-10-10 PROCEDURE — 36000050 ZZH SURGERY LEVEL 2 1ST 30 MIN: Performed by: OBSTETRICS & GYNECOLOGY

## 2019-10-10 PROCEDURE — 88305 TISSUE EXAM BY PATHOLOGIST: CPT | Mod: 26 | Performed by: OBSTETRICS & GYNECOLOGY

## 2019-10-10 RX ORDER — LIDOCAINE 40 MG/G
CREAM TOPICAL
Status: DISCONTINUED | OUTPATIENT
Start: 2019-10-10 | End: 2019-10-10 | Stop reason: HOSPADM

## 2019-10-10 RX ORDER — SODIUM CHLORIDE, SODIUM LACTATE, POTASSIUM CHLORIDE, CALCIUM CHLORIDE 600; 310; 30; 20 MG/100ML; MG/100ML; MG/100ML; MG/100ML
INJECTION, SOLUTION INTRAVENOUS CONTINUOUS
Status: DISCONTINUED | OUTPATIENT
Start: 2019-10-10 | End: 2019-10-10 | Stop reason: HOSPADM

## 2019-10-10 RX ORDER — MEPERIDINE HYDROCHLORIDE 50 MG/ML
12.5 INJECTION INTRAMUSCULAR; INTRAVENOUS; SUBCUTANEOUS
Status: DISCONTINUED | OUTPATIENT
Start: 2019-10-10 | End: 2019-10-10 | Stop reason: HOSPADM

## 2019-10-10 RX ORDER — ONDANSETRON 2 MG/ML
INJECTION INTRAMUSCULAR; INTRAVENOUS PRN
Status: DISCONTINUED | OUTPATIENT
Start: 2019-10-10 | End: 2019-10-10

## 2019-10-10 RX ORDER — GLYCOPYRROLATE 0.2 MG/ML
INJECTION, SOLUTION INTRAMUSCULAR; INTRAVENOUS PRN
Status: DISCONTINUED | OUTPATIENT
Start: 2019-10-10 | End: 2019-10-10

## 2019-10-10 RX ORDER — ONDANSETRON 2 MG/ML
4 INJECTION INTRAMUSCULAR; INTRAVENOUS EVERY 30 MIN PRN
Status: DISCONTINUED | OUTPATIENT
Start: 2019-10-10 | End: 2019-10-10 | Stop reason: HOSPADM

## 2019-10-10 RX ORDER — DEXAMETHASONE SODIUM PHOSPHATE 4 MG/ML
INJECTION, SOLUTION INTRA-ARTICULAR; INTRALESIONAL; INTRAMUSCULAR; INTRAVENOUS; SOFT TISSUE PRN
Status: DISCONTINUED | OUTPATIENT
Start: 2019-10-10 | End: 2019-10-10

## 2019-10-10 RX ORDER — FENTANYL CITRATE 50 UG/ML
25-50 INJECTION, SOLUTION INTRAMUSCULAR; INTRAVENOUS
Status: DISCONTINUED | OUTPATIENT
Start: 2019-10-10 | End: 2019-10-10 | Stop reason: HOSPADM

## 2019-10-10 RX ORDER — HYDROMORPHONE HYDROCHLORIDE 1 MG/ML
.3-.5 INJECTION, SOLUTION INTRAMUSCULAR; INTRAVENOUS; SUBCUTANEOUS EVERY 10 MIN PRN
Status: DISCONTINUED | OUTPATIENT
Start: 2019-10-10 | End: 2019-10-10 | Stop reason: HOSPADM

## 2019-10-10 RX ORDER — IBUPROFEN 400 MG/1
400 TABLET, FILM COATED ORAL EVERY 6 HOURS PRN
COMMUNITY

## 2019-10-10 RX ORDER — ACETAMINOPHEN 325 MG/1
650 TABLET ORAL
Status: DISCONTINUED | OUTPATIENT
Start: 2019-10-10 | End: 2019-10-10 | Stop reason: HOSPADM

## 2019-10-10 RX ORDER — DOXYCYCLINE 100 MG/10ML
100 INJECTION, POWDER, LYOPHILIZED, FOR SOLUTION INTRAVENOUS
Status: COMPLETED | OUTPATIENT
Start: 2019-10-10 | End: 2019-10-10

## 2019-10-10 RX ORDER — PROPOFOL 10 MG/ML
INJECTION, EMULSION INTRAVENOUS PRN
Status: DISCONTINUED | OUTPATIENT
Start: 2019-10-10 | End: 2019-10-10

## 2019-10-10 RX ORDER — KETOROLAC TROMETHAMINE 30 MG/ML
INJECTION, SOLUTION INTRAMUSCULAR; INTRAVENOUS PRN
Status: DISCONTINUED | OUTPATIENT
Start: 2019-10-10 | End: 2019-10-10

## 2019-10-10 RX ORDER — NALOXONE HYDROCHLORIDE 0.4 MG/ML
.1-.4 INJECTION, SOLUTION INTRAMUSCULAR; INTRAVENOUS; SUBCUTANEOUS
Status: DISCONTINUED | OUTPATIENT
Start: 2019-10-10 | End: 2019-10-10 | Stop reason: HOSPADM

## 2019-10-10 RX ORDER — FENTANYL CITRATE 50 UG/ML
INJECTION, SOLUTION INTRAMUSCULAR; INTRAVENOUS PRN
Status: DISCONTINUED | OUTPATIENT
Start: 2019-10-10 | End: 2019-10-10

## 2019-10-10 RX ORDER — ACETAMINOPHEN 325 MG/1
650 TABLET ORAL EVERY 4 HOURS PRN
Qty: 50 TABLET | Refills: 0 | Status: SHIPPED | OUTPATIENT
Start: 2019-10-10

## 2019-10-10 RX ORDER — ONDANSETRON 4 MG/1
4 TABLET, ORALLY DISINTEGRATING ORAL EVERY 30 MIN PRN
Status: DISCONTINUED | OUTPATIENT
Start: 2019-10-10 | End: 2019-10-10 | Stop reason: HOSPADM

## 2019-10-10 RX ADMIN — FENTANYL CITRATE 100 MCG: 50 INJECTION, SOLUTION INTRAMUSCULAR; INTRAVENOUS at 09:12

## 2019-10-10 RX ADMIN — SODIUM CHLORIDE, POTASSIUM CHLORIDE, SODIUM LACTATE AND CALCIUM CHLORIDE: 600; 310; 30; 20 INJECTION, SOLUTION INTRAVENOUS at 09:07

## 2019-10-10 RX ADMIN — GLYCOPYRROLATE 0.2 MG: 0.2 INJECTION, SOLUTION INTRAMUSCULAR; INTRAVENOUS at 09:12

## 2019-10-10 RX ADMIN — PROPOFOL 200 MG: 10 INJECTION, EMULSION INTRAVENOUS at 09:13

## 2019-10-10 RX ADMIN — DOXYCYCLINE 100 MG: 100 INJECTION, POWDER, LYOPHILIZED, FOR SOLUTION INTRAVENOUS at 09:07

## 2019-10-10 RX ADMIN — MIDAZOLAM 2 MG: 1 INJECTION INTRAMUSCULAR; INTRAVENOUS at 09:07

## 2019-10-10 RX ADMIN — LIDOCAINE HYDROCHLORIDE 30 MG: 10 INJECTION, SOLUTION EPIDURAL; INFILTRATION; INTRACAUDAL; PERINEURAL at 09:12

## 2019-10-10 RX ADMIN — KETOROLAC TROMETHAMINE 30 MG: 30 INJECTION, SOLUTION INTRAMUSCULAR at 09:38

## 2019-10-10 RX ADMIN — GLYCOPYRROLATE 0.1 MG: 0.2 INJECTION, SOLUTION INTRAMUSCULAR; INTRAVENOUS at 09:18

## 2019-10-10 RX ADMIN — ONDANSETRON HYDROCHLORIDE 4 MG: 2 INJECTION, SOLUTION INTRAVENOUS at 09:35

## 2019-10-10 RX ADMIN — DEXAMETHASONE SODIUM PHOSPHATE 4 MG: 4 INJECTION, SOLUTION INTRA-ARTICULAR; INTRALESIONAL; INTRAMUSCULAR; INTRAVENOUS; SOFT TISSUE at 09:12

## 2019-10-10 ASSESSMENT — MIFFLIN-ST. JEOR: SCORE: 1644.36

## 2019-10-10 NOTE — OP NOTE
Suction Dilation and Curettage Note:    Preoperative Diagnosis:  1.  Retained products of conception    Postoperative Diagnosis:  Same    Procedure:  Suction dilation and curettage with ultrasound     Surgeon:  Carolee Smyth DO, D.O.    Circulator: Heydi Watt RN  Relief Circulator: Jasmyn House RN  Scrub Person: Le Worrell    Anesthesiologist: Curry Haley MD  CRNA: Olegario Watson APRN CRNA    Anesthesia:  MAC       Findings:  A 4 weeks sized anteverted uterus, small amounts of products of conception.  No evidence of uterine perforation. Thin endometrial lining on ultrasound after the procedure.         Complications:  None    Specimens:  Products of conception.    Estimated Blood Loss:        Indications:  Bre Saleh is a 34 year old female with retained products of conception that was confirmed on ultrasound.  Her blood type is Rh positive.  Bre has consented for surgical management of this problem by dilation and curettage.  Her consent form is signed and on the chart.      Description of Operative Procedure:    Bre Saleh was taken to the operating room with IV fluids running.  She was placed on the operating table and MAC anesthesia was obtained without difficulty.  Bre was then place in the dorsal lithotomy position with legs in the Kobi stirrups.  She was prepped and draped in the usual sterile fashion.      Attention was then turned to the vagina.  An weight speculum was placed into the vagina and the anterior lip of the cervix was grasped with a long Allis clamp.  The cervix was noted to be 1 cm dilated.  The cervix was then sequentially dilated to 10 millimeters using blunt cervical dilators.  The 10 mm suction curette was activated and then introduced into the uterine cavity.  The suction curette was rotated in order to clear the uterus of all products of conception under ultrasound guidance.  A sharp curette was then used to confirm a gritty texture  throughout all four quadrants of the uterine cavity.  The suction curette was then reintroduced into the uterus in order to clear the cavity of all remaining clots and debris.  Minimal bleeding was noted.  All instruments were removed from the vagina and cervix.  The uterus was massaged and found to be firm.   Bre tolerated the procedure well.  She was taken to the recovery area in stable condition.    Carolee Smyth DO

## 2019-10-10 NOTE — DISCHARGE INSTRUCTIONS
DR. YU BAH, D.O.                CLINIC PHONE NUMBER:  [phone]  [facility]    You received Toradol, an IV form of ibuprofen (Motrin) at 9:40 AM.  Do not take any ibuprofen products until 3:40 pm    GENERAL ANESTHESIA OR SEDATION ADULT DISCHARGE INSTRUCTIONS   SPECIAL PRECAUTIONS FOR 24 HOURS AFTER SURGERY    IT IS NOT UNUSUAL TO FEEL LIGHT-HEADED OR FAINT, UP TO 24 HOURS AFTER SURGERY OR WHILE TAKING PAIN MEDICATION.  IF YOU HAVE THESE SYMPTOMS; SIT FOR A FEW MINUTES BEFORE STANDING AND HAVE SOMEONE ASSIST YOU WHEN YOU GET UP TO WALK OR USE THE BATHROOM.    YOU SHOULD REST AND RELAX FOR THE NEXT 24 HOURS AND YOU MUST MAKE ARRANGEMENTS TO HAVE SOMEONE STAY WITH YOU FOR AT LEAST 24 HOURS AFTER YOUR DISCHARGE.  AVOID HAZARDOUS AND STRENUOUS ACTIVITIES.  DO NOT MAKE IMPORTANT DECISIONS FOR 24 HOURS.    DO NOT DRIVE ANY VEHICLE OR OPERATE MECHANICAL EQUIPMENT FOR 24 HOURS FOLLOWING THE END OF YOUR SURGERY.  EVEN THOUGH YOU MAY FEEL NORMAL, YOUR REACTIONS MAY BE AFFECTED BY THE MEDICATION YOU HAVE RECEIVED.    DO NOT DRINK ALCOHOLIC BEVERAGES FOR 24 HOURS FOLLOWING YOUR SURGERY.    DRINK CLEAR LIQUIDS (APPLE JUICE, GINGER ALE, 7-UP, BROTH, ETC.).  PROGRESS TO YOUR REGULAR DIET AS YOU FEEL ABLE.    YOU MAY HAVE A DRY MOUTH, A SORE THROAT, MUSCLES ACHES OR TROUBLE SLEEPING.  THESE SHOULD GO AWAY AFTER 24 HOURS.    CALL YOUR DOCTOR FOR ANY OF THE FOLLOWING:  SIGNS OF INFECTION (FEVER, GROWING TENDERNESS AT THE SURGERY SITE, A LARGE AMOUNT OF DRAINAGE OR BLEEDING, SEVERE PAIN, FOUL-SMELLING DRAINAGE, REDNESS OR SWELLING.    IT HAS BEEN OVER 8 TO 10 HOURS SINCE SURGERY AND YOU ARE STILL NOT ABLE TO URINATE (PASS WATER).     DILATION AND CURETTAGE AND DILATION AND EVACUATION DISCHARGE INSTRUCTIONS    DO NOT DRIVE A CAR, DRINK ALCOHOL OR USE MACHINERY FOR THE NEXT 24 HOURS.  YOU SHOULD WAIT UNTIL YOU HAVE RECOVERED BEFORE MAKING ANY IMPORTANT DECISIONS.    PAIN AND DISCOMFORT  YOU MAY HAVE CRAMPS OR A LOW  BACKACHE FOR 24 TO 48 HOURS.  TYLENOL (ACETAMINOPHEN) OR MOTRIN (IBUPROFEN) MAY HELP, OR YOUR DOCTOR MAY GIVE YOU PAIN MEDICINE.  CALL YOUR DOCTOR IF PAIN CANNOT BE CONTROLLED.  YOU MAY FEEL DROWSY AND WEAK FOR A DAY OR TWO.    VAGINAL DISCHARGE  YOU MAY HAVE SOME BLEEDING OR DISCHARGE FOR UP TO TWO WEEKS.  DO NOT DOUCHE, USE TAMPONS OR HAVE SEX (INTERCOURSE) IN THE FIRST WEEK.  CALL YOUR DOCTOR IF YOU SOAK MORE THAN ONE MAXI PAD (SANITARY NAPKIN) PER HOUR, OR IF YOU PASS LARGE BLOOD CLOTS.    OTHER SYMPTOMS  YOU MAY HAVE A LOW FEVER FOR THE FIRST TWO DAYS.  CALL YOUR DOCTOR IF YOUR FEVER GOES OVER 101 DEGREES FAHRENHEIT.    IF YOU HAVE NAUSEA (FEEL SICK TO YOUR STOMACH), STAY IN BED.  TRY DRINKING A SMALL AMOUNT 7-UP, TEA OR SOUP.    DIET AND ACTIVITY  EAT LIGHT MEALS AND DRINK PLENTY OF FLUIDS FOR THE FIRST 24 HOURS (OR LONGER, IF YOU HAVE NAUSEA).    YOU MAY BATHE, SHOWER AND CLIMB STAIRS.  MOST WOMEN CAN RETURN TO WORK AFTER 24 HOURS.  YOU MAY GO BACK TO YOUR OTHER ACTIVITIES AFTER YOUR PAIN GOES AWAY.

## 2019-10-10 NOTE — ANESTHESIA PREPROCEDURE EVALUATION
Anesthesia Pre-Procedure Evaluation    Patient: Bre Saleh   MRN: [id number] : 1985          Preoperative Diagnosis: Products of conception, abnormal [O02.9]    Procedure(s):  DILATION AND CURETTAGE, WITH ULTRASOUND GUIDANCE    Past Medical History:   Diagnosis Date     Miscarriage      Past Surgical History:   Procedure Laterality Date     GYN SURGERY  2015    surgery for ectopic pregnancy     Anesthesia Evaluation     .             ROS/MED HX    ENT/Pulmonary:  - neg pulmonary ROS     Neurologic:  - neg neurologic ROS     Cardiovascular:  - neg cardiovascular ROS       METS/Exercise Tolerance:     Hematologic:  - neg hematologic  ROS       Musculoskeletal:  - neg musculoskeletal ROS       GI/Hepatic:  - neg GI/hepatic ROS      (-) GERD and hiatal hernia   Renal/Genitourinary:  - ROS Renal section negative       Endo:     (+) Obesity, .   (-) Type I DM, Type II DM, thyroid disease, chronic steroid usage and other endocrine disorder   Psychiatric:  - neg psychiatric ROS       Infectious Disease:  - neg infectious disease ROS       Malignancy:         Other:    (+) No chance of pregnancy                         Physical Exam      Airway   Mallampati: II  TM distance: >3 FB  Neck ROM: full    Dental     Cardiovascular   Rhythm and rate: regular and normal  (-) no murmur    Pulmonary    breath sounds clear to auscultation    Other findings: No lab results found.   No lab results found.        No results found for: WBC, HGB, HCT, PLT, CRP, SED, NA, POTASSIUM, CHLORIDE, CO2, BUN, CR, GLC, CIERRA, PHOS, MAG, ALBUMIN, PROTTOTAL, ALT, AST, GGT, ALKPHOS, BILITOTAL, BILIDIRECT, LIPASE, AMYLASE, ELVIN, PTT, INR, FIBR, TSH, T4, T3, HCG, HCGS, CKTOTAL, CKMB, TROPN    Preop Vitals  BP Readings from Last 3 Encounters:   10/10/19 102/72   17 114/76   17 119/77    Pulse Readings from Last 3 Encounters:   10/10/19 77   17 72   17 101      Resp Readings from Last 3 Encounters:   10/10/19 20   17  "16   04/07/17 20    SpO2 Readings from Last 3 Encounters:   10/10/19 98%   09/01/17 97%   04/07/17 98%      Temp Readings from Last 1 Encounters:   10/10/19 97.7  F (36.5  C) (Temporal)    Ht Readings from Last 1 Encounters:   10/10/19 1.702 m (5' 7\")      Wt Readings from Last 1 Encounters:   10/10/19 91.2 kg (201 lb)    Estimated body mass index is 31.48 kg/m  as calculated from the following:    Height as of this encounter: 1.702 m (5' 7\").    Weight as of this encounter: 91.2 kg (201 lb).       Anesthesia Plan      History & Physical Review  History and physical reviewed and following examination; no interval change.    ASA Status:  2 .    NPO Status:  > 8 hours    Plan for General and LMA with Propofol induction.   PONV prophylaxis:  Ondansetron (or other 5HT-3)       Postoperative Care  Postoperative pain management:  IV analgesics and Oral pain medications.      Consents  Anesthetic plan, risks, benefits and alternatives discussed with:  Patient..                 Devin Lima MD                    .  "

## 2019-10-10 NOTE — OR NURSING
Dr. Smyth did not sign the prescription for tylenol.  Patient stated they will stop on the way home to  some tylenol.  Prescription discarded.

## 2019-10-10 NOTE — ANESTHESIA CARE TRANSFER NOTE
Patient: Bre Saleh    Procedure(s):  DILATION AND CURETTAGE, WITH ULTRASOUND GUIDANCE    Diagnosis: Products of conception, abnormal [O02.9]  Diagnosis Additional Information: No value filed.    Anesthesia Type:   General, LMA     Note:  Airway :Face Mask  Patient transferred to:PACU  Handoff Report: Identifed the Patient, Identified the Reponsible Provider, Reviewed the pertinent medical history, Discussed the surgical course, Reviewed Intra-OP anesthesia mangement and issues during anesthesia, Set expectations for post-procedure period and Allowed opportunity for questions and acknowledgement of understanding      Vitals: (Last set prior to Anesthesia Care Transfer)    CRNA VITALS  10/10/2019 0912 - 10/10/2019 0946      10/10/2019             Pulse:  80    SpO2:  100 %    Resp Rate (observed):  10                Electronically Signed By: LARRY Gonzalez CRNA  October 10, 2019  9:46 AM

## 2019-10-10 NOTE — ANESTHESIA POSTPROCEDURE EVALUATION
Patient: Bre Saleh    Procedure(s):  DILATION AND CURETTAGE, WITH ULTRASOUND GUIDANCE    Diagnosis:Products of conception, abnormal [O02.9]  Diagnosis Additional Information: No value filed.    Anesthesia Type:  General, LMA    Note:  Anesthesia Post Evaluation    Patient location during evaluation: PACU  Patient participation: Able to fully participate in evaluation  Level of consciousness: awake and alert  Pain management: adequate  multimodal analgesia used between 6 hours prior to anesthesia start to PACU dischargeAirway patency: patent  Cardiovascular status: acceptable  Respiratory status: acceptable  two or more mitigation strategies used for obstructive sleep apneaHydration status: acceptable  PONV: none     Anesthetic complications: None          Last vitals:  Vitals:    10/10/19 1000 10/10/19 1015 10/10/19 1045   BP: 112/72 103/66 99/63   Pulse:   60   Resp: 14 15 14   Temp:  96.9  F (36.1  C) 97.2  F (36.2  C)   SpO2: 100% 100% 99%         Electronically Signed By: Curry Haley MD  October 10, 2019  1:26 PM

## 2019-10-11 LAB — COPATH REPORT: NORMAL

## 2020-02-14 ENCOUNTER — HOSPITAL ENCOUNTER (EMERGENCY)
Facility: CLINIC | Age: 35
Discharge: HOME | End: 2020-02-14
Attending: PHYSICIAN ASSISTANT | Admitting: PHYSICIAN ASSISTANT
Payer: COMMERCIAL

## 2020-02-14 VITALS
HEART RATE: 70 BPM | SYSTOLIC BLOOD PRESSURE: 130 MMHG | WEIGHT: 204 LBS | TEMPERATURE: 98.5 F | BODY MASS INDEX: 31.95 KG/M2 | OXYGEN SATURATION: 99 % | DIASTOLIC BLOOD PRESSURE: 80 MMHG | RESPIRATION RATE: 18 BRPM

## 2020-02-14 DIAGNOSIS — R51.9 ACUTE NONINTRACTABLE HEADACHE, UNSPECIFIED HEADACHE TYPE: ICD-10-CM

## 2020-02-14 LAB
ANION GAP SERPL CALCULATED.3IONS-SCNC: 6 MMOL/L (ref 3–14)
B-HCG FREE SERPL-ACNC: <5 IU/L
BASOPHILS # BLD AUTO: 0 10E9/L (ref 0–0.2)
BASOPHILS NFR BLD AUTO: 0.1 %
BUN SERPL-MCNC: 11 MG/DL (ref 7–30)
CALCIUM SERPL-MCNC: 9.1 MG/DL (ref 8.5–10.1)
CHLORIDE SERPL-SCNC: 104 MMOL/L (ref 94–109)
CO2 SERPL-SCNC: 25 MMOL/L (ref 20–32)
CREAT SERPL-MCNC: 0.56 MG/DL (ref 0.52–1.04)
DIFFERENTIAL METHOD BLD: ABNORMAL
EOSINOPHIL # BLD AUTO: 0 10E9/L (ref 0–0.7)
EOSINOPHIL NFR BLD AUTO: 0 %
ERYTHROCYTE [DISTWIDTH] IN BLOOD BY AUTOMATED COUNT: 13.3 % (ref 10–15)
GFR SERPL CREATININE-BSD FRML MDRD: >90 ML/MIN/{1.73_M2}
GLUCOSE SERPL-MCNC: 129 MG/DL (ref 70–99)
HCT VFR BLD AUTO: 45.4 % (ref 35–47)
HGB BLD-MCNC: 14.5 G/DL (ref 11.7–15.7)
IMM GRANULOCYTES # BLD: 0 10E9/L (ref 0–0.4)
IMM GRANULOCYTES NFR BLD: 0.3 %
LYMPHOCYTES # BLD AUTO: 1.5 10E9/L (ref 0.8–5.3)
LYMPHOCYTES NFR BLD AUTO: 14 %
MCH RBC QN AUTO: 28.9 PG (ref 26.5–33)
MCHC RBC AUTO-ENTMCNC: 31.9 G/DL (ref 31.5–36.5)
MCV RBC AUTO: 90 FL (ref 78–100)
MONOCYTES # BLD AUTO: 0.4 10E9/L (ref 0–1.3)
MONOCYTES NFR BLD AUTO: 3.3 %
NEUTROPHILS # BLD AUTO: 8.9 10E9/L (ref 1.6–8.3)
NEUTROPHILS NFR BLD AUTO: 82.3 %
NRBC # BLD AUTO: 0 10*3/UL
NRBC BLD AUTO-RTO: 0 /100
PLATELET # BLD AUTO: 264 10E9/L (ref 150–450)
POTASSIUM SERPL-SCNC: 4.4 MMOL/L (ref 3.4–5.3)
RBC # BLD AUTO: 5.02 10E12/L (ref 3.8–5.2)
SODIUM SERPL-SCNC: 135 MMOL/L (ref 133–144)
WBC # BLD AUTO: 10.8 10E9/L (ref 4–11)

## 2020-02-14 PROCEDURE — 96361 HYDRATE IV INFUSION ADD-ON: CPT

## 2020-02-14 PROCEDURE — 96374 THER/PROPH/DIAG INJ IV PUSH: CPT

## 2020-02-14 PROCEDURE — 85025 COMPLETE CBC W/AUTO DIFF WBC: CPT | Performed by: PHYSICIAN ASSISTANT

## 2020-02-14 PROCEDURE — 25800030 ZZH RX IP 258 OP 636: Performed by: PHYSICIAN ASSISTANT

## 2020-02-14 PROCEDURE — 84702 CHORIONIC GONADOTROPIN TEST: CPT

## 2020-02-14 PROCEDURE — 25000128 H RX IP 250 OP 636: Performed by: PHYSICIAN ASSISTANT

## 2020-02-14 PROCEDURE — 96375 TX/PRO/DX INJ NEW DRUG ADDON: CPT

## 2020-02-14 PROCEDURE — 80048 BASIC METABOLIC PNL TOTAL CA: CPT | Performed by: PHYSICIAN ASSISTANT

## 2020-02-14 PROCEDURE — 25000132 ZZH RX MED GY IP 250 OP 250 PS 637: Performed by: PHYSICIAN ASSISTANT

## 2020-02-14 PROCEDURE — 99284 EMERGENCY DEPT VISIT MOD MDM: CPT | Mod: 25

## 2020-02-14 RX ORDER — DIPHENHYDRAMINE HYDROCHLORIDE 50 MG/ML
25 INJECTION INTRAMUSCULAR; INTRAVENOUS ONCE
Status: COMPLETED | OUTPATIENT
Start: 2020-02-14 | End: 2020-02-14

## 2020-02-14 RX ORDER — METHOCARBAMOL 500 MG/1
500 TABLET, FILM COATED ORAL 4 TIMES DAILY PRN
Qty: 20 TABLET | Refills: 0 | Status: SHIPPED | OUTPATIENT
Start: 2020-02-14

## 2020-02-14 RX ORDER — METHOCARBAMOL 750 MG/1
750 TABLET, FILM COATED ORAL ONCE
Status: COMPLETED | OUTPATIENT
Start: 2020-02-14 | End: 2020-02-14

## 2020-02-14 RX ORDER — KETOROLAC TROMETHAMINE 15 MG/ML
15 INJECTION, SOLUTION INTRAMUSCULAR; INTRAVENOUS ONCE
Status: COMPLETED | OUTPATIENT
Start: 2020-02-14 | End: 2020-02-14

## 2020-02-14 RX ORDER — DEXAMETHASONE SODIUM PHOSPHATE 10 MG/ML
10 INJECTION, SOLUTION INTRAMUSCULAR; INTRAVENOUS ONCE
Status: COMPLETED | OUTPATIENT
Start: 2020-02-14 | End: 2020-02-14

## 2020-02-14 RX ORDER — METOCLOPRAMIDE HYDROCHLORIDE 5 MG/ML
10 INJECTION INTRAMUSCULAR; INTRAVENOUS ONCE
Status: COMPLETED | OUTPATIENT
Start: 2020-02-14 | End: 2020-02-14

## 2020-02-14 RX ORDER — METOCLOPRAMIDE 10 MG/1
10 TABLET ORAL 4 TIMES DAILY PRN
Qty: 12 TABLET | Refills: 0 | Status: SHIPPED | OUTPATIENT
Start: 2020-02-14 | End: 2020-02-17

## 2020-02-14 RX ADMIN — SODIUM CHLORIDE 1000 ML: 9 INJECTION, SOLUTION INTRAVENOUS at 18:07

## 2020-02-14 RX ADMIN — KETOROLAC TROMETHAMINE 15 MG: 15 INJECTION, SOLUTION INTRAMUSCULAR; INTRAVENOUS at 18:09

## 2020-02-14 RX ADMIN — METOCLOPRAMIDE HYDROCHLORIDE 10 MG: 5 INJECTION INTRAMUSCULAR; INTRAVENOUS at 18:11

## 2020-02-14 RX ADMIN — DEXAMETHASONE SODIUM PHOSPHATE 10 MG: 10 INJECTION, SOLUTION INTRAMUSCULAR; INTRAVENOUS at 18:14

## 2020-02-14 RX ADMIN — DIPHENHYDRAMINE HYDROCHLORIDE 25 MG: 50 INJECTION, SOLUTION INTRAMUSCULAR; INTRAVENOUS at 18:07

## 2020-02-14 RX ADMIN — METHOCARBAMOL TABLETS 750 MG: 750 TABLET, COATED ORAL at 18:12

## 2020-02-14 ASSESSMENT — ENCOUNTER SYMPTOMS
HEADACHES: 1
VOMITING: 1
DIZZINESS: 1
COUGH: 0
NAUSEA: 1
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
## 2020-02-14 NOTE — ED AVS SNAPSHOT
[facility] Emergency Department  [address]  [facility] [address]  Phone:  [phone]  Fax:  [phone]                                    Bre Saleh   MRN: [id number]    Department:  [facility] Emergency Department   Date of Visit:  2/14/2020           After Visit Summary Signature Page    I have received my discharge instructions, and my questions have been answered. I have discussed any challenges I see with this plan with the nurse or doctor.    ..........................................................................................................................................  Patient/Patient Representative Signature      ..........................................................................................................................................  Patient Representative Print Name and Relationship to Patient    ..................................................               ................................................  Date                                   Time    ..........................................................................................................................................  Reviewed by Signature/Title    ...................................................              ..............................................  Date                                               Time          22EPIC Rev 08/18

## 2020-02-14 NOTE — ED TRIAGE NOTES
Headache lasting about a week. Was at [facility] on the 6th. They did CT scans and tried giving meds. Told had migraine and headache isn't getting better. Vomiting started yesterday. Unable to keep anything down today. Sensitive to light and sound. Unable to work the last week.

## 2020-02-15 NOTE — ED NOTES
Pt provided with discharge paperwork and educated on recommended follow-up with PCP. Pt educated on how to manage symptoms at home along with sign/symptoms to seek medical attention. Pt voiced understanding and denied any questions at discharge.

## 2020-03-10 ENCOUNTER — HEALTH MAINTENANCE LETTER (OUTPATIENT)
Age: 35
End: 2020-03-10

## 2020-12-27 ENCOUNTER — HEALTH MAINTENANCE LETTER (OUTPATIENT)
Age: 35
End: 2020-12-27

## 2021-01-06 LAB
HEPATITIS B SURFACE ANTIGEN (EXTERNAL): NEGATIVE
HIV1+2 AB SERPL QL IA: NEGATIVE
RUBELLA ANTIBODY IGG (EXTERNAL): POSITIVE

## 2021-04-24 ENCOUNTER — HEALTH MAINTENANCE LETTER (OUTPATIENT)
Age: 36
End: 2021-04-24

## 2021-05-30 VITALS — BODY MASS INDEX: 29.6 KG/M2 | HEIGHT: 66 IN | WEIGHT: 184.2 LBS

## 2021-05-30 VITALS — HEIGHT: 66 IN | WEIGHT: 185.8 LBS | BODY MASS INDEX: 29.86 KG/M2

## 2021-05-30 VITALS — BODY MASS INDEX: 32.93 KG/M2 | WEIGHT: 204.9 LBS | HEIGHT: 66 IN

## 2021-06-08 NOTE — PROGRESS NOTES
"Assessment:     31 y.o. female s/p  with manual removal of placenta on 1/10/17.  Possible mild endometritis versus GC/CT infection.  Bacterial Vaginosis    Plan:     -Discussed patient with Dr. Livingston who recommends awaiting GC/CT/wet prep results and treat as indicated. If GC/CT negative, plan to treat for suspected mild endometritis as outpatient with oral Keflex and Flagyl. CNM will notify on-call CNM to watch for GC/CT NAAT lab results tonight.   -CNM called patient after visit. She is aware of plan.    Subjective:       Bre Saleh is a 31 y.o. female who presents for evaluation of an abnormal vaginal discharge, itching, and \"dead\" odor. Symptoms have been present since Saturday night however more pronounced today. Reports chills on Sat night but that has since resolved. Does not feel she ever had a fever. Denies significant uterine/cramping pain. Has used Ibuprofen intermittently. Last dose 800 mg at 0100 today. She had one blood clot Sat night but none since. Lochia flow has been tapering since birth with short increases during breastfeeding. Never soaking pads. She denies dysuria. Last BM yesterday and \"large\". Sexually transmitted infection risk: moderate risk as patient with HX of chlamydia infection in pregnancy, KASEY was negative in pregnancy.  Patient is breastfeeding.    HX significant for:  on 1/10/17 with manual removal of the placenta due to cord evulsion. She was not prescribed ABX after manual removal and was afebrile during admission in the hospital and immediately PP.     The following portions of the patient's history were reviewed and updated as appropriate: current medications and problem list.    Review of Systems  Pertinent items are noted in HPI.  Constitutional: positive for chills  Gastrointestinal: negative  Genitourinary:positive for vaginal discharge      Objective:     Visit Vitals     /64 (Patient Site: Right Arm, Patient Position: Sitting, Cuff Size: Adult " "Regular)     Pulse 84     Temp 98.2  F (36.8  C) (Oral)     Ht 5' 6\" (1.676 m)     Wt 184 lb 3.2 oz (83.6 kg)     LMP  (LMP Unknown)     Breastfeeding Yes     BMI 29.73 kg/m2     Labs:  Wet prep: Positive clue cells. Negative yeast, negative trichomonas.  GC/CT pending  CBC: pending    Physical Exam:    General Appearance:    Alert, cooperative, in NAD, appears stated age   Abdomen:     Soft, non-tender to palpation, bowel sounds active all four quadrants, no masses, no organomegaly   Genitalia:   Pelvic exam: normal appearing external genitalia, vulva, and urethra. Tissues approximated. Moderate yellow-green discharge visualized at introitus and mal-odorous. No lesions.  VAGINA: vaginal wall erythema present, and significant irritation at location of vaginal walls with insertion of speculum, no lesions.  CERVIX: mucopurulent cervical discharge present; negative CMT(no chandelier sign) but patient reports vaginal wall tenderness during exam.  UTERUS: soft, mild-tenderness during bimanual exam  ADNEXA: normal adnexa in size, nontender and no masses, non tender     Total time spent with patient: 25 minutes, >50% time spent counseling and coordination of care.    LARRY Huerta,NATHAN  "

## 2021-06-08 NOTE — PROGRESS NOTES
Bre Saleh is here by herself for a routine prenatal visit at 37w1d.  She has no concerns and is feeling well.  Wondering about travel to [place] this wknd to bring daughter to a birthday party - I advised against as it is 2-3 hours away.  Patient going to look for alternative transportation for her daughter.  Waterbirth consent signed today.  Fetal movement is normal. Interval weight gain is approriate. Group B strep was negative.  Desires VE - unchanged from last visit except cervix more anterior.  Encouraged to schedule weekly visits from to/through her EDB.   Anticipatory guidance, signs of symptoms of labor or SROM, third trimester warning signs, when to call and CNM contact information reviewed.

## 2021-06-09 NOTE — PROGRESS NOTES
Assessment:        Bre Saleh is a 32 y.o. female here for postpartum exam at 6 weeks postpartum. Pap smear done at today's visit. Endometritis and BV 1/18/17, completed course of antibiotics.    Plan:        1. Normal postpartum follow up  2. Contraception: OCP (estrogen/progesterone)  3. Methergine 0.2 mg po QID X 3 days    Subjective:       Bre Saleh is a 32 y.o. female who presents for a postpartum visit. She is 6 weeks postpartum following a spontaneous vaginal delivery. I have fully reviewed the prenatal and intrapartum course. The delivery was at 38 gestational weeks. Outcome: spontaneous vaginal delivery. Postpartum course has been complicated by infection in uterus, started on antibiotics resolved and feeling better now. No fever, no abdominal pain or vaginal odor.  Baby's course has been uncomplicated except for some constipation, only having a BM every 2-3 days while breast feeding well. Baby is feeding by breast. Bled for  5 weeks postpartum, initially had spotting for weeks then had three days of heavier bleeding which is now resolved..  Currently bleeding  no bleeding. Bowel function is normal. Bladder function is normal. Patient has not resumed intercourse. Contraception method is OCP (estrogen/progesterone). Postpartum depression screening score: 5     The following portions of the patient's history were reviewed and updated as appropriate: allergies, current medications and problem list    Review of Systems  General:  Denies problem  Eyes: Denies problem  Ears/Nose/Throat: Denies problem  Cardiovascular: Denies problem  Respiratory:  Denies problem  Gastrointestinal:  Denies problem, Genitourinary: Denies problem  Musculoskeletal:  Denies problem  Skin: Denies problem  Neurologic: Denies problem  Psychiatric: Denies problem  Endocrine: Denies problem  Heme/Lymphatic: Denies problem   Allergic/Immunologic: Denies problem          Objective:         Vitals:    02/20/17 1000   BP: 98/66  "  Pulse: 72   Weight: 185 lb 12.8 oz (84.3 kg)   Height: 5' 6\" (1.676 m)       Physical Exam:  General Appearance: Alert, cooperative, no distress, appears stated age  Head: Normocephalic, without obvious abnormality, atraumatic  Throat: Lips, mucosa, and tongue normal; teeth and gums normal  Neck: Supple, symmetrical, trachea midline, no adenopathy;  thyroid: not enlarged, symmetric, no tenderness/mass/nodules; no carotid bruit or JVD  Back: Symmetric, no curvature, ROM normal, no CVA tenderness  Lungs: Clear to auscultation bilaterally, respirations unlabored  Breasts: No breast masses, tenderness, asymmetry, or nipple discharge.  Heart: Regular rate and rhythm, S1 and S2 normal, no murmur, rub, or gallop, Abdomen: Soft, non-tender, no masses, no organomegaly  Pelvic:external genitalia without lesions or abnormal discharge normal hair distribution. vagina is pink and rugated, white discharge noted. cervix is pink, no lesions noted. mucousy discharge or possibly tissue noted in cervical os. small amt teased out  with ring forceps. tender to touch with exam. no active bleeding noted.  Extremities: Extremities normal, atraumatic, no cyanosis or edema  Skin: Skin color, texture, turgor normal, no rashes or lesions  Lymph nodes: Cervical, supraclavicular, and axillary nodes normal  Neurologic: Normal      "

## 2021-07-11 ENCOUNTER — ANESTHESIA EVENT (OUTPATIENT)
Dept: OBGYN | Facility: CLINIC | Age: 36
End: 2021-07-11
Payer: COMMERCIAL

## 2021-07-11 ENCOUNTER — ANESTHESIA (OUTPATIENT)
Dept: OBGYN | Facility: CLINIC | Age: 36
End: 2021-07-11
Payer: COMMERCIAL

## 2021-07-11 ENCOUNTER — HOSPITAL ENCOUNTER (INPATIENT)
Facility: CLINIC | Age: 36
LOS: 2 days | Discharge: HOME | End: 2021-07-13
Attending: OBSTETRICS & GYNECOLOGY | Admitting: OBSTETRICS & GYNECOLOGY
Payer: COMMERCIAL

## 2021-07-11 PROBLEM — N83.201 OVARIAN CYST, RIGHT: Status: ACTIVE | Noted: 2021-07-11

## 2021-07-11 PROBLEM — Z37.9 NORMAL LABOR: Status: ACTIVE | Noted: 2021-07-11

## 2021-07-11 PROBLEM — O99.213 PREGNANCY COMPLICATED BY OBESITY, THIRD TRIMESTER: Status: ACTIVE | Noted: 2021-07-11

## 2021-07-11 PROBLEM — Z30.09 UNWANTED FERTILITY: Status: ACTIVE | Noted: 2021-07-11

## 2021-07-11 PROBLEM — O09.523 HIGH RISK PREGNANCY, MULTIGRAVIDA OF ADVANCED MATERNAL AGE IN THIRD TRIMESTER: Status: ACTIVE | Noted: 2021-07-11

## 2021-07-11 PROBLEM — O09.10: Status: ACTIVE | Noted: 2021-07-11

## 2021-07-11 LAB
ABO/RH(D): NORMAL
HGB BLD-MCNC: 12.5 G/DL (ref 11.7–15.7)
SPECIMEN EXPIRATION DATE: NORMAL

## 2021-07-11 PROCEDURE — 250N000011 HC RX IP 250 OP 636: Performed by: ANESTHESIOLOGY

## 2021-07-11 PROCEDURE — 86900 BLOOD TYPING SEROLOGIC ABO: CPT | Performed by: OBSTETRICS & GYNECOLOGY

## 2021-07-11 PROCEDURE — 250N000009 HC RX 250: Performed by: OBSTETRICS & GYNECOLOGY

## 2021-07-11 PROCEDURE — 85018 HEMOGLOBIN: CPT | Performed by: OBSTETRICS & GYNECOLOGY

## 2021-07-11 PROCEDURE — 10907ZC DRAINAGE OF AMNIOTIC FLUID, THERAPEUTIC FROM PRODUCTS OF CONCEPTION, VIA NATURAL OR ARTIFICIAL OPENING: ICD-10-PCS | Performed by: OBSTETRICS & GYNECOLOGY

## 2021-07-11 PROCEDURE — 87635 SARS-COV-2 COVID-19 AMP PRB: CPT | Performed by: OBSTETRICS & GYNECOLOGY

## 2021-07-11 PROCEDURE — 120N000001 HC R&B MED SURG/OB

## 2021-07-11 PROCEDURE — 258N000003 HC RX IP 258 OP 636: Performed by: OBSTETRICS & GYNECOLOGY

## 2021-07-11 PROCEDURE — 722N000001 HC LABOR CARE VAGINAL DELIVERY SINGLE

## 2021-07-11 PROCEDURE — 86780 TREPONEMA PALLIDUM: CPT | Performed by: OBSTETRICS & GYNECOLOGY

## 2021-07-11 PROCEDURE — 86850 RBC ANTIBODY SCREEN: CPT | Performed by: OBSTETRICS & GYNECOLOGY

## 2021-07-11 PROCEDURE — G0463 HOSPITAL OUTPT CLINIC VISIT: HCPCS

## 2021-07-11 PROCEDURE — 250N000011 HC RX IP 250 OP 636: Performed by: OBSTETRICS & GYNECOLOGY

## 2021-07-11 PROCEDURE — 258N000003 HC RX IP 258 OP 636: Performed by: ANESTHESIOLOGY

## 2021-07-11 PROCEDURE — 370N000003 HC ANESTHESIA WARD SERVICE

## 2021-07-11 PROCEDURE — 00HU33Z INSERTION OF INFUSION DEVICE INTO SPINAL CANAL, PERCUTANEOUS APPROACH: ICD-10-PCS | Performed by: ANESTHESIOLOGY

## 2021-07-11 PROCEDURE — 3E0R3BZ INTRODUCTION OF ANESTHETIC AGENT INTO SPINAL CANAL, PERCUTANEOUS APPROACH: ICD-10-PCS | Performed by: ANESTHESIOLOGY

## 2021-07-11 PROCEDURE — 250N000009 HC RX 250: Performed by: ANESTHESIOLOGY

## 2021-07-11 RX ORDER — BISACODYL 10 MG
10 SUPPOSITORY, RECTAL RECTAL DAILY PRN
Status: DISCONTINUED | OUTPATIENT
Start: 2021-07-11 | End: 2021-07-13 | Stop reason: HOSPADM

## 2021-07-11 RX ORDER — OXYTOCIN/0.9 % SODIUM CHLORIDE 30/500 ML
100-340 PLASTIC BAG, INJECTION (ML) INTRAVENOUS ONCE
Status: DISCONTINUED | OUTPATIENT
Start: 2021-07-11 | End: 2021-07-13 | Stop reason: HOSPADM

## 2021-07-11 RX ORDER — LIDOCAINE 40 MG/G
CREAM TOPICAL
Status: DISCONTINUED | OUTPATIENT
Start: 2021-07-11 | End: 2021-07-11

## 2021-07-11 RX ORDER — TRANEXAMIC ACID 10 MG/ML
1 INJECTION, SOLUTION INTRAVENOUS EVERY 30 MIN PRN
Status: DISCONTINUED | OUTPATIENT
Start: 2021-07-11 | End: 2021-07-13 | Stop reason: HOSPADM

## 2021-07-11 RX ORDER — MISOPROSTOL 200 UG/1
800 TABLET ORAL PRN
Status: DISCONTINUED | OUTPATIENT
Start: 2021-07-11 | End: 2021-07-13 | Stop reason: HOSPADM

## 2021-07-11 RX ORDER — SODIUM CHLORIDE, SODIUM LACTATE, POTASSIUM CHLORIDE, CALCIUM CHLORIDE 600; 310; 30; 20 MG/100ML; MG/100ML; MG/100ML; MG/100ML
INJECTION, SOLUTION INTRAVENOUS CONTINUOUS
Status: DISCONTINUED | OUTPATIENT
Start: 2021-07-11 | End: 2021-07-11

## 2021-07-11 RX ORDER — METOCLOPRAMIDE HYDROCHLORIDE 5 MG/ML
10 INJECTION INTRAMUSCULAR; INTRAVENOUS EVERY 6 HOURS PRN
Status: DISCONTINUED | OUTPATIENT
Start: 2021-07-11 | End: 2021-07-11

## 2021-07-11 RX ORDER — CARBOPROST TROMETHAMINE 250 UG/ML
250 INJECTION, SOLUTION INTRAMUSCULAR
Status: DISCONTINUED | OUTPATIENT
Start: 2021-07-11 | End: 2021-07-11

## 2021-07-11 RX ORDER — MISOPROSTOL 200 UG/1
800 TABLET ORAL PRN
Status: DISCONTINUED | OUTPATIENT
Start: 2021-07-11 | End: 2021-07-11

## 2021-07-11 RX ORDER — LIDOCAINE HYDROCHLORIDE 20 MG/ML
INJECTION, SOLUTION INFILTRATION; PERINEURAL PRN
Status: DISCONTINUED | OUTPATIENT
Start: 2021-07-11 | End: 2021-07-11

## 2021-07-11 RX ORDER — ONDANSETRON 4 MG/1
4 TABLET, ORALLY DISINTEGRATING ORAL EVERY 6 HOURS PRN
Status: DISCONTINUED | OUTPATIENT
Start: 2021-07-11 | End: 2021-07-11

## 2021-07-11 RX ORDER — PROCHLORPERAZINE 25 MG
25 SUPPOSITORY, RECTAL RECTAL EVERY 12 HOURS PRN
Status: DISCONTINUED | OUTPATIENT
Start: 2021-07-11 | End: 2021-07-11

## 2021-07-11 RX ORDER — TRANEXAMIC ACID 10 MG/ML
1 INJECTION, SOLUTION INTRAVENOUS EVERY 30 MIN PRN
Status: DISCONTINUED | OUTPATIENT
Start: 2021-07-11 | End: 2021-07-11

## 2021-07-11 RX ORDER — METHYLERGONOVINE MALEATE 0.2 MG/ML
200 INJECTION INTRAVENOUS
Status: DISCONTINUED | OUTPATIENT
Start: 2021-07-11 | End: 2021-07-13 | Stop reason: HOSPADM

## 2021-07-11 RX ORDER — OXYTOCIN/0.9 % SODIUM CHLORIDE 30/500 ML
340 PLASTIC BAG, INJECTION (ML) INTRAVENOUS CONTINUOUS PRN
Status: COMPLETED | OUTPATIENT
Start: 2021-07-11 | End: 2021-07-11

## 2021-07-11 RX ORDER — ACETAMINOPHEN 325 MG/1
650 TABLET ORAL EVERY 4 HOURS PRN
Status: DISCONTINUED | OUTPATIENT
Start: 2021-07-11 | End: 2021-07-13 | Stop reason: HOSPADM

## 2021-07-11 RX ORDER — NALOXONE HYDROCHLORIDE 0.4 MG/ML
0.2 INJECTION, SOLUTION INTRAMUSCULAR; INTRAVENOUS; SUBCUTANEOUS
Status: DISCONTINUED | OUTPATIENT
Start: 2021-07-11 | End: 2021-07-11

## 2021-07-11 RX ORDER — PROCHLORPERAZINE MALEATE 10 MG
10 TABLET ORAL EVERY 6 HOURS PRN
Status: DISCONTINUED | OUTPATIENT
Start: 2021-07-11 | End: 2021-07-11

## 2021-07-11 RX ORDER — MODIFIED LANOLIN
OINTMENT (GRAM) TOPICAL
Status: DISCONTINUED | OUTPATIENT
Start: 2021-07-11 | End: 2021-07-13 | Stop reason: HOSPADM

## 2021-07-11 RX ORDER — HYDROCORTISONE 2.5 %
CREAM (GRAM) TOPICAL 3 TIMES DAILY PRN
Status: DISCONTINUED | OUTPATIENT
Start: 2021-07-11 | End: 2021-07-13 | Stop reason: HOSPADM

## 2021-07-11 RX ORDER — FENTANYL CITRATE 50 UG/ML
50-100 INJECTION, SOLUTION INTRAMUSCULAR; INTRAVENOUS
Status: DISCONTINUED | OUTPATIENT
Start: 2021-07-11 | End: 2021-07-11

## 2021-07-11 RX ORDER — CARBOPROST TROMETHAMINE 250 UG/ML
250 INJECTION, SOLUTION INTRAMUSCULAR
Status: DISCONTINUED | OUTPATIENT
Start: 2021-07-11 | End: 2021-07-13 | Stop reason: HOSPADM

## 2021-07-11 RX ORDER — MISOPROSTOL 200 UG/1
400 TABLET ORAL PRN
Status: DISCONTINUED | OUTPATIENT
Start: 2021-07-11 | End: 2021-07-13 | Stop reason: HOSPADM

## 2021-07-11 RX ORDER — IBUPROFEN 800 MG/1
800 TABLET, FILM COATED ORAL EVERY 6 HOURS PRN
Status: DISCONTINUED | OUTPATIENT
Start: 2021-07-11 | End: 2021-07-13 | Stop reason: HOSPADM

## 2021-07-11 RX ORDER — NALOXONE HYDROCHLORIDE 0.4 MG/ML
0.4 INJECTION, SOLUTION INTRAMUSCULAR; INTRAVENOUS; SUBCUTANEOUS
Status: DISCONTINUED | OUTPATIENT
Start: 2021-07-11 | End: 2021-07-11

## 2021-07-11 RX ORDER — OXYTOCIN 10 [USP'U]/ML
10 INJECTION, SOLUTION INTRAMUSCULAR; INTRAVENOUS PRN
Status: DISCONTINUED | OUTPATIENT
Start: 2021-07-11 | End: 2021-07-11

## 2021-07-11 RX ORDER — LIDOCAINE HYDROCHLORIDE AND EPINEPHRINE 15; 5 MG/ML; UG/ML
INJECTION, SOLUTION EPIDURAL PRN
Status: DISCONTINUED | OUTPATIENT
Start: 2021-07-11 | End: 2021-07-11

## 2021-07-11 RX ORDER — METOCLOPRAMIDE 10 MG/1
10 TABLET ORAL EVERY 6 HOURS PRN
Status: DISCONTINUED | OUTPATIENT
Start: 2021-07-11 | End: 2021-07-11

## 2021-07-11 RX ORDER — OXYTOCIN 10 [USP'U]/ML
10 INJECTION, SOLUTION INTRAMUSCULAR; INTRAVENOUS ONCE
Status: DISCONTINUED | OUTPATIENT
Start: 2021-07-11 | End: 2021-07-13 | Stop reason: HOSPADM

## 2021-07-11 RX ORDER — MISOPROSTOL 200 UG/1
400 TABLET ORAL PRN
Status: DISCONTINUED | OUTPATIENT
Start: 2021-07-11 | End: 2021-07-11

## 2021-07-11 RX ORDER — IBUPROFEN 600 MG/1
600 TABLET, FILM COATED ORAL
Status: DISCONTINUED | OUTPATIENT
Start: 2021-07-11 | End: 2021-07-11

## 2021-07-11 RX ORDER — METHYLERGONOVINE MALEATE 0.2 MG/ML
200 INJECTION INTRAVENOUS
Status: DISCONTINUED | OUTPATIENT
Start: 2021-07-11 | End: 2021-07-11

## 2021-07-11 RX ORDER — NALBUPHINE HYDROCHLORIDE 10 MG/ML
2.5-5 INJECTION, SOLUTION INTRAMUSCULAR; INTRAVENOUS; SUBCUTANEOUS EVERY 6 HOURS PRN
Status: DISCONTINUED | OUTPATIENT
Start: 2021-07-11 | End: 2021-07-11

## 2021-07-11 RX ORDER — DOCUSATE SODIUM 100 MG/1
100 CAPSULE, LIQUID FILLED ORAL DAILY
Status: DISCONTINUED | OUTPATIENT
Start: 2021-07-12 | End: 2021-07-13 | Stop reason: HOSPADM

## 2021-07-11 RX ORDER — ACETAMINOPHEN 325 MG/1
650 TABLET ORAL EVERY 4 HOURS PRN
Status: DISCONTINUED | OUTPATIENT
Start: 2021-07-11 | End: 2021-07-11

## 2021-07-11 RX ORDER — OXYTOCIN/0.9 % SODIUM CHLORIDE 30/500 ML
340 PLASTIC BAG, INJECTION (ML) INTRAVENOUS CONTINUOUS PRN
Status: DISCONTINUED | OUTPATIENT
Start: 2021-07-11 | End: 2021-07-11

## 2021-07-11 RX ORDER — KETOROLAC TROMETHAMINE 30 MG/ML
30 INJECTION, SOLUTION INTRAMUSCULAR; INTRAVENOUS
Status: DISCONTINUED | OUTPATIENT
Start: 2021-07-11 | End: 2021-07-11

## 2021-07-11 RX ORDER — OXYTOCIN 10 [USP'U]/ML
10 INJECTION, SOLUTION INTRAMUSCULAR; INTRAVENOUS PRN
Status: DISCONTINUED | OUTPATIENT
Start: 2021-07-11 | End: 2021-07-13 | Stop reason: HOSPADM

## 2021-07-11 RX ORDER — FENTANYL CITRATE-0.9 % NACL/PF 10 MCG/ML
100 PLASTIC BAG, INJECTION (ML) INTRAVENOUS EVERY 5 MIN PRN
Status: DISCONTINUED | OUTPATIENT
Start: 2021-07-11 | End: 2021-07-11

## 2021-07-11 RX ORDER — BUPIVACAINE HYDROCHLORIDE 2.5 MG/ML
INJECTION, SOLUTION EPIDURAL; INFILTRATION; INTRACAUDAL PRN
Status: DISCONTINUED | OUTPATIENT
Start: 2021-07-11 | End: 2021-07-11

## 2021-07-11 RX ORDER — EPHEDRINE SULFATE 50 MG/ML
5 INJECTION, SOLUTION INTRAMUSCULAR; INTRAVENOUS; SUBCUTANEOUS
Status: DISCONTINUED | OUTPATIENT
Start: 2021-07-11 | End: 2021-07-11

## 2021-07-11 RX ORDER — ONDANSETRON 2 MG/ML
4 INJECTION INTRAMUSCULAR; INTRAVENOUS EVERY 6 HOURS PRN
Status: DISCONTINUED | OUTPATIENT
Start: 2021-07-11 | End: 2021-07-11

## 2021-07-11 RX ADMIN — Medication 100 MCG: at 21:31

## 2021-07-11 RX ADMIN — BUPIVACAINE HYDROCHLORIDE 5 ML: 2.5 INJECTION, SOLUTION EPIDURAL; INFILTRATION; INTRACAUDAL at 21:12

## 2021-07-11 RX ADMIN — SODIUM CHLORIDE, POTASSIUM CHLORIDE, SODIUM LACTATE AND CALCIUM CHLORIDE 1000 ML: 600; 310; 30; 20 INJECTION, SOLUTION INTRAVENOUS at 20:30

## 2021-07-11 RX ADMIN — EPHEDRINE SULFATE 5 MG: 50 INJECTION INTRAVENOUS at 21:17

## 2021-07-11 RX ADMIN — LIDOCAINE HYDROCHLORIDE,EPINEPHRINE BITARTRATE 3 MG: 15; .005 INJECTION, SOLUTION EPIDURAL; INFILTRATION; INTRACAUDAL; PERINEURAL at 21:02

## 2021-07-11 RX ADMIN — SODIUM CHLORIDE, POTASSIUM CHLORIDE, SODIUM LACTATE AND CALCIUM CHLORIDE 1000 ML: 600; 310; 30; 20 INJECTION, SOLUTION INTRAVENOUS at 21:32

## 2021-07-11 RX ADMIN — EPHEDRINE SULFATE 5 MG: 50 INJECTION INTRAVENOUS at 21:12

## 2021-07-11 RX ADMIN — LIDOCAINE HYDROCHLORIDE 5 ML: 20 INJECTION, SOLUTION INFILTRATION; PERINEURAL at 21:12

## 2021-07-11 RX ADMIN — FENTANYL CITRATE 100 MCG: 50 INJECTION, SOLUTION INTRAMUSCULAR; INTRAVENOUS at 20:02

## 2021-07-11 RX ADMIN — Medication: at 21:01

## 2021-07-11 RX ADMIN — SODIUM CHLORIDE, POTASSIUM CHLORIDE, SODIUM LACTATE AND CALCIUM CHLORIDE 500 ML: 600; 310; 30; 20 INJECTION, SOLUTION INTRAVENOUS at 21:34

## 2021-07-11 RX ADMIN — LIDOCAINE HYDROCHLORIDE,EPINEPHRINE BITARTRATE 2 MG: 15; .005 INJECTION, SOLUTION EPIDURAL; INFILTRATION; INTRACAUDAL; PERINEURAL at 21:07

## 2021-07-11 RX ADMIN — Medication 100 MCG: at 21:46

## 2021-07-11 RX ADMIN — SODIUM CHLORIDE, POTASSIUM CHLORIDE, SODIUM LACTATE AND CALCIUM CHLORIDE 125 ML/HR: 600; 310; 30; 20 INJECTION, SOLUTION INTRAVENOUS at 21:22

## 2021-07-11 RX ADMIN — SODIUM CHLORIDE, POTASSIUM CHLORIDE, SODIUM LACTATE AND CALCIUM CHLORIDE: 600; 310; 30; 20 INJECTION, SOLUTION INTRAVENOUS at 21:59

## 2021-07-11 RX ADMIN — Medication 340 ML/HR: at 22:53

## 2021-07-11 RX ADMIN — METHYLERGONOVINE MALEATE 200 MCG: 0.2 INJECTION, SOLUTION INTRAMUSCULAR; INTRAVENOUS at 22:54

## 2021-07-11 NOTE — PROVIDER NOTIFICATION
Dr Whit Weber informed of patient arrival and assessment including the following:    Reason for maternal/fetal assessment uterine contractions. Pt reports contractions starting yesterday but getting more consistent and painful around at 1100 today. Also noted losing her mucous plug this morning.. Fetal status normal baseline, moderate variability, accelerations present and no decelerations. Plan per provider/orders received for admit to labor and delivery. Pt is ok to be off monitors to ambulate after reactive tracing obtained .

## 2021-07-11 NOTE — H&P
"Labor and Delivery H&P    Bre Saleh is a 36 year old G7 p 3033at 38 5/7  weeks gestation  who presents to labor and delivery with painful regular ctx and cervical change.  Cervix was closed in clinic, now -3/60/-3    Pregnancy complicated by:  -AMA  -fam hx pre eclampsia, pt on low dose ASA  -elevated GCT, normal GTT  -Obesity with BMI 33  -right ovarian cyst, 5 cm, on level 2 US  -hx left salpingectomy for ectopic in   -desires sterilization by salpingectomy (right), papers signed 21  -separation from FOB, has good support    Pelvis proven to 6.5#    Patient Active Problem List   Diagnosis     Indication for care in labor and delivery, antepartum       Meds:  PNV, low dose ASA, tyl prn    Allergies:  Eggs made her sleepy in childhood, tolerating eggs and baked goods now without issue      OB History    Para Term  AB Living   4 1 1 0 1 1   SAB TAB Ectopic Multiple Live Births   0 0 1 0 1      # Outcome Date GA Lbr Elvin/2nd Weight Sex Delivery Anes PTL Lv   4 Current            3 Ectopic  8w0d             Birth Comments: L salpingectomy   2 Term 11 38w4d / 03:00 2.892 kg (6 lb 6 oz) F Vag-Spont EPI N DOROTEO      Birth Comments: PROM, Pitocin, stitches, \"a lot of bleeding after\" no transfusion      Name: Staci   1                 Past Medical History:   Diagnosis Date     Miscarriage        Past Surgical History:   Procedure Laterality Date     DILATION AND CURETTAGE SUCTION WITH ULTRASOUND GUIDANCE N/A 10/10/2019    Procedure: Suction dilation and curettage with ultrasound;  Surgeon: Carolee Smyth DO;  Location: [facility] OR     GYN SURGERY      surgery for ectopic pregnancy     SALPINGECTOMY Left        ROS: negative.  Feeling well.  Ctx increasing in intensity.    OBJECTIVE:  See epic for vitals    WDWN gravid woman in NAD, leaning forward for ctx.  Lungs clear  CV RRR  Abd gravid, nontender, vertex by leopolds, EFW 8#    SVE: per RN -3/60/-3    FHT category " 1.    GBBS negative      ASSESSMENT:  36 year old yo  at 38 5/7 weeks in early labor with regular, painful contractions and cervical change.    Pregnancy complicated by:  -AMA  -fam hx pre eclampsia, pt on low dose ASA  -elevated GCT, normal GTT  -Obesity with BMI 33  -right ovarian cyst, 5 cm, on level 2 US  -hx left salpingectomy for ectopic in   -desires sterilization by salpingectomy (right), papers signed 21  -separation from FOB, has good support    This baby seems larger than her previous deliveries, EFW 8# by leopolds.      PLAN:  Admit to labor and delivery.  Hopes to avoid epidural and have natural labor, not opposed to AROM if needed for progress, but will need to wait for descent of baby as currently station too high.  Ambulate prn, Routine intrapartum care.    Anticipate right salpingectomy to complete elective sterilization post partum (ar at time of  if this becomes necessary) and will look for and treat or remove right ovarian cyst if still present, at that time.      Whit Weber MD  2021

## 2021-07-11 NOTE — PLAN OF CARE
Data: Patient presented to [facility]: 2021  2:44 PM.  Reason for maternal/fetal assessment is uterine contractions. Patient reports contractions starting yesterday but getting more consistent and painful around at 1100 today. Also noted losing her mucous plug this morning. Patient is a .  Prenatal record reviewed. Pregnancy has been uncomplicated. Pt does have a cyst on her right ovary on last US.  Gestational Age 38w5d. VSS. Fetal movement active. Patient denies leaking of vaginal fluid/rupture of membranes, vaginal bleeding, pelvic pressure, nausea, vomiting, headache, visual disturbances, epigastric or URQ pain, significant edema. Support person is present.   Action: Verbal consent for EFM. Triage assessment completed. Bill of rights reviewed.  Response: Patient verbalized agreement with plan. Will contact Dr Whit Weber with update and for further orders.

## 2021-07-11 NOTE — PROGRESS NOTES
Coping well with ctx which are intensifying, breathing well, looks uncomfortable.  Declines analgesia for now.    Category 1 tracing.  Ctx q 2-4 minutes  SVE per RN at 1800:   4/60/-4  Membranes intact.    Anticipate .    Wiht Weber MD on 2021 at 6:58 PM

## 2021-07-12 ENCOUNTER — ANESTHESIA EVENT (OUTPATIENT)
Dept: SURGERY | Facility: CLINIC | Age: 36
End: 2021-07-12
Payer: COMMERCIAL

## 2021-07-12 ENCOUNTER — ANESTHESIA (OUTPATIENT)
Dept: SURGERY | Facility: CLINIC | Age: 36
End: 2021-07-12
Payer: COMMERCIAL

## 2021-07-12 LAB
ANTIBODY SCREEN: NEGATIVE
HGB BLD-MCNC: 11.2 G/DL (ref 11.7–15.7)
SARS-COV-2 RNA RESP QL NAA+PROBE: NEGATIVE
SPECIMEN EXPIRATION DATE: NORMAL
T PALLIDUM AB SER QL: NONREACTIVE

## 2021-07-12 PROCEDURE — 0UB70ZZ EXCISION OF BILATERAL FALLOPIAN TUBES, OPEN APPROACH: ICD-10-PCS | Performed by: STUDENT IN AN ORGANIZED HEALTH CARE EDUCATION/TRAINING PROGRAM

## 2021-07-12 PROCEDURE — 250N000013 HC RX MED GY IP 250 OP 250 PS 637: Performed by: OBSTETRICS & GYNECOLOGY

## 2021-07-12 PROCEDURE — 999N000141 HC STATISTIC PRE-PROCEDURE NURSING ASSESSMENT: Performed by: STUDENT IN AN ORGANIZED HEALTH CARE EDUCATION/TRAINING PROGRAM

## 2021-07-12 PROCEDURE — 120N000001 HC R&B MED SURG/OB

## 2021-07-12 PROCEDURE — 250N000009 HC RX 250: Performed by: ANESTHESIOLOGY

## 2021-07-12 PROCEDURE — 370N000017 HC ANESTHESIA TECHNICAL FEE, PER MIN: Performed by: STUDENT IN AN ORGANIZED HEALTH CARE EDUCATION/TRAINING PROGRAM

## 2021-07-12 PROCEDURE — 88305 TISSUE EXAM BY PATHOLOGIST: CPT | Mod: 26 | Performed by: PATHOLOGY

## 2021-07-12 PROCEDURE — 36415 COLL VENOUS BLD VENIPUNCTURE: CPT | Performed by: OBSTETRICS & GYNECOLOGY

## 2021-07-12 PROCEDURE — 88302 TISSUE EXAM BY PATHOLOGIST: CPT | Mod: TC | Performed by: STUDENT IN AN ORGANIZED HEALTH CARE EDUCATION/TRAINING PROGRAM

## 2021-07-12 PROCEDURE — 258N000003 HC RX IP 258 OP 636: Performed by: ANESTHESIOLOGY

## 2021-07-12 PROCEDURE — 360N000075 HC SURGERY LEVEL 2, PER MIN: Performed by: STUDENT IN AN ORGANIZED HEALTH CARE EDUCATION/TRAINING PROGRAM

## 2021-07-12 PROCEDURE — 272N000001 HC OR GENERAL SUPPLY STERILE: Performed by: STUDENT IN AN ORGANIZED HEALTH CARE EDUCATION/TRAINING PROGRAM

## 2021-07-12 PROCEDURE — 250N000009 HC RX 250: Performed by: NURSE ANESTHETIST, CERTIFIED REGISTERED

## 2021-07-12 PROCEDURE — 250N000011 HC RX IP 250 OP 636: Performed by: NURSE ANESTHETIST, CERTIFIED REGISTERED

## 2021-07-12 PROCEDURE — 0UB00ZZ EXCISION OF RIGHT OVARY, OPEN APPROACH: ICD-10-PCS | Performed by: STUDENT IN AN ORGANIZED HEALTH CARE EDUCATION/TRAINING PROGRAM

## 2021-07-12 PROCEDURE — 710N000009 HC RECOVERY PHASE 1, LEVEL 1, PER MIN: Performed by: STUDENT IN AN ORGANIZED HEALTH CARE EDUCATION/TRAINING PROGRAM

## 2021-07-12 PROCEDURE — 250N000011 HC RX IP 250 OP 636: Performed by: ANESTHESIOLOGY

## 2021-07-12 PROCEDURE — 250N000009 HC RX 250: Performed by: STUDENT IN AN ORGANIZED HEALTH CARE EDUCATION/TRAINING PROGRAM

## 2021-07-12 PROCEDURE — 85018 HEMOGLOBIN: CPT | Performed by: OBSTETRICS & GYNECOLOGY

## 2021-07-12 PROCEDURE — 258N000003 HC RX IP 258 OP 636: Performed by: NURSE ANESTHETIST, CERTIFIED REGISTERED

## 2021-07-12 PROCEDURE — 88302 TISSUE EXAM BY PATHOLOGIST: CPT | Mod: 26 | Performed by: PATHOLOGY

## 2021-07-12 RX ORDER — ONDANSETRON 4 MG/1
4 TABLET, ORALLY DISINTEGRATING ORAL EVERY 30 MIN PRN
Status: DISCONTINUED | OUTPATIENT
Start: 2021-07-12 | End: 2021-07-12 | Stop reason: HOSPADM

## 2021-07-12 RX ORDER — FENTANYL CITRATE 50 UG/ML
INJECTION, SOLUTION INTRAMUSCULAR; INTRAVENOUS PRN
Status: DISCONTINUED | OUTPATIENT
Start: 2021-07-12 | End: 2021-07-12

## 2021-07-12 RX ORDER — FENTANYL CITRATE 50 UG/ML
50 INJECTION, SOLUTION INTRAMUSCULAR; INTRAVENOUS EVERY 5 MIN PRN
Status: DISCONTINUED | OUTPATIENT
Start: 2021-07-12 | End: 2021-07-12 | Stop reason: HOSPADM

## 2021-07-12 RX ORDER — LIDOCAINE HYDROCHLORIDE 20 MG/ML
INJECTION, SOLUTION INFILTRATION; PERINEURAL PRN
Status: DISCONTINUED | OUTPATIENT
Start: 2021-07-12 | End: 2021-07-12

## 2021-07-12 RX ORDER — NALOXONE HYDROCHLORIDE 0.4 MG/ML
0.4 INJECTION, SOLUTION INTRAMUSCULAR; INTRAVENOUS; SUBCUTANEOUS
Status: DISCONTINUED | OUTPATIENT
Start: 2021-07-12 | End: 2021-07-13 | Stop reason: HOSPADM

## 2021-07-12 RX ORDER — NALOXONE HYDROCHLORIDE 0.4 MG/ML
0.2 INJECTION, SOLUTION INTRAMUSCULAR; INTRAVENOUS; SUBCUTANEOUS
Status: DISCONTINUED | OUTPATIENT
Start: 2021-07-12 | End: 2021-07-13 | Stop reason: HOSPADM

## 2021-07-12 RX ORDER — BUPIVACAINE HYDROCHLORIDE AND EPINEPHRINE 2.5; 5 MG/ML; UG/ML
INJECTION, SOLUTION EPIDURAL; INFILTRATION; INTRACAUDAL; PERINEURAL PRN
Status: DISCONTINUED | OUTPATIENT
Start: 2021-07-12 | End: 2021-07-12 | Stop reason: HOSPADM

## 2021-07-12 RX ORDER — LIDOCAINE HCL/EPINEPHRINE/PF 2%-1:200K
VIAL (ML) INJECTION PRN
Status: DISCONTINUED | OUTPATIENT
Start: 2021-07-12 | End: 2021-07-12

## 2021-07-12 RX ORDER — OXYCODONE HYDROCHLORIDE 5 MG/1
5 TABLET ORAL EVERY 4 HOURS PRN
Status: DISCONTINUED | OUTPATIENT
Start: 2021-07-12 | End: 2021-07-13 | Stop reason: HOSPADM

## 2021-07-12 RX ORDER — ONDANSETRON 2 MG/ML
4 INJECTION INTRAMUSCULAR; INTRAVENOUS EVERY 30 MIN PRN
Status: DISCONTINUED | OUTPATIENT
Start: 2021-07-12 | End: 2021-07-12 | Stop reason: HOSPADM

## 2021-07-12 RX ORDER — ONDANSETRON 2 MG/ML
INJECTION INTRAMUSCULAR; INTRAVENOUS PRN
Status: DISCONTINUED | OUTPATIENT
Start: 2021-07-12 | End: 2021-07-12

## 2021-07-12 RX ORDER — SODIUM CHLORIDE, SODIUM LACTATE, POTASSIUM CHLORIDE, CALCIUM CHLORIDE 600; 310; 30; 20 MG/100ML; MG/100ML; MG/100ML; MG/100ML
INJECTION, SOLUTION INTRAVENOUS CONTINUOUS PRN
Status: DISCONTINUED | OUTPATIENT
Start: 2021-07-12 | End: 2021-07-12

## 2021-07-12 RX ORDER — SODIUM CHLORIDE, SODIUM LACTATE, POTASSIUM CHLORIDE, CALCIUM CHLORIDE 600; 310; 30; 20 MG/100ML; MG/100ML; MG/100ML; MG/100ML
INJECTION, SOLUTION INTRAVENOUS CONTINUOUS
Status: DISCONTINUED | OUTPATIENT
Start: 2021-07-12 | End: 2021-07-12 | Stop reason: HOSPADM

## 2021-07-12 RX ORDER — PROPOFOL 10 MG/ML
INJECTION, EMULSION INTRAVENOUS CONTINUOUS PRN
Status: DISCONTINUED | OUTPATIENT
Start: 2021-07-12 | End: 2021-07-12

## 2021-07-12 RX ORDER — PROPOFOL 10 MG/ML
INJECTION, EMULSION INTRAVENOUS PRN
Status: DISCONTINUED | OUTPATIENT
Start: 2021-07-12 | End: 2021-07-12

## 2021-07-12 RX ADMIN — FENTANYL CITRATE 50 MCG: 50 INJECTION, SOLUTION INTRAMUSCULAR; INTRAVENOUS at 13:45

## 2021-07-12 RX ADMIN — ACETAMINOPHEN 650 MG: 325 TABLET, FILM COATED ORAL at 11:15

## 2021-07-12 RX ADMIN — SODIUM CHLORIDE, POTASSIUM CHLORIDE, SODIUM LACTATE AND CALCIUM CHLORIDE: 600; 310; 30; 20 INJECTION, SOLUTION INTRAVENOUS at 13:17

## 2021-07-12 RX ADMIN — FENTANYL CITRATE 50 MCG: 50 INJECTION, SOLUTION INTRAMUSCULAR; INTRAVENOUS at 13:40

## 2021-07-12 RX ADMIN — IBUPROFEN 800 MG: 800 TABLET, FILM COATED ORAL at 08:04

## 2021-07-12 RX ADMIN — ACETAMINOPHEN 650 MG: 325 TABLET, FILM COATED ORAL at 23:03

## 2021-07-12 RX ADMIN — IBUPROFEN 800 MG: 800 TABLET, FILM COATED ORAL at 22:28

## 2021-07-12 RX ADMIN — SODIUM CHLORIDE, POTASSIUM CHLORIDE, SODIUM LACTATE AND CALCIUM CHLORIDE: 600; 310; 30; 20 INJECTION, SOLUTION INTRAVENOUS at 15:52

## 2021-07-12 RX ADMIN — DOCUSATE SODIUM 100 MG: 100 CAPSULE, LIQUID FILLED ORAL at 20:30

## 2021-07-12 RX ADMIN — FENTANYL CITRATE 50 MCG: 50 INJECTION, SOLUTION INTRAMUSCULAR; INTRAVENOUS at 15:06

## 2021-07-12 RX ADMIN — PROPOFOL 100 MCG/KG/MIN: 10 INJECTION, EMULSION INTRAVENOUS at 13:28

## 2021-07-12 RX ADMIN — LIDOCAINE HYDROCHLORIDE 50 MG: 20 INJECTION, SOLUTION INFILTRATION; PERINEURAL at 13:26

## 2021-07-12 RX ADMIN — FENTANYL CITRATE 50 MCG: 50 INJECTION, SOLUTION INTRAMUSCULAR; INTRAVENOUS at 14:38

## 2021-07-12 RX ADMIN — PROPOFOL 30 MG: 10 INJECTION, EMULSION INTRAVENOUS at 13:27

## 2021-07-12 RX ADMIN — IBUPROFEN 800 MG: 800 TABLET, FILM COATED ORAL at 17:20

## 2021-07-12 RX ADMIN — LIDOCAINE HYDROCHLORIDE,EPINEPHRINE BITARTRATE 17 MG: 20; .005 INJECTION, SOLUTION EPIDURAL; INFILTRATION; INTRACAUDAL; PERINEURAL at 13:17

## 2021-07-12 RX ADMIN — FENTANYL CITRATE 50 MCG: 50 INJECTION, SOLUTION INTRAMUSCULAR; INTRAVENOUS at 14:48

## 2021-07-12 RX ADMIN — IBUPROFEN 800 MG: 800 TABLET, FILM COATED ORAL at 01:51

## 2021-07-12 RX ADMIN — ONDANSETRON HYDROCHLORIDE 4 MG: 2 INJECTION, SOLUTION INTRAVENOUS at 14:09

## 2021-07-12 NOTE — PROVIDER NOTIFICATION
07/11/21 2020   Provider Notification   Provider Name/Title Dr Dupont   Method of Notification Phone   Request Evaluate - Remote   Notification Reason Labor Status;Uterine Activity;Pain       Dr. Dupont called, advised of labor status including contraction pattern, fetal tracing, pain management.  At this  Time patient is requesting labor epidural.   Hayley Felix RN on 7/11/2021 at 8:25 PM

## 2021-07-12 NOTE — PLAN OF CARE
Pt. vitals stable. Vital signs stable after tubal. Incision CDI. Pain managed with PRN ibuprofen. Independent in infant and personal cares. Breastfeeding infant. Attentive to infant needs and bonding well with infant. Friend at bedside, supportive.

## 2021-07-12 NOTE — ANESTHESIA PREPROCEDURE EVALUATION
Anesthesia Pre-Procedure Evaluation    Patient: Bre Saleh   MRN: [id number] : 1985        Preoperative Diagnosis: * No surgery found *   Procedure :      Past Medical History:   Diagnosis Date     Miscarriage       Past Surgical History:   Procedure Laterality Date     DILATION AND CURETTAGE SUCTION WITH ULTRASOUND GUIDANCE N/A 10/10/2019    Procedure: Suction dilation and curettage with ultrasound;  Surgeon: Carolee Smyth DO;  Location: [facility] OR     GYN SURGERY  2015    surgery for ectopic pregnancy     SALPINGECTOMY Left 2014      Allergies   Allergen Reactions     Chicken-Derived Products (Egg) Other (See Comments) and Swelling     Sleepy  Sleepy  Patient reports egg reaction only during childhood, has been tolerating and eating eggs, baked goods containing eggs since.        Social History     Tobacco Use     Smoking status: Never Smoker     Smokeless tobacco: Never Used   Substance Use Topics     Alcohol use: No      Wt Readings from Last 1 Encounters:   20 92.5 kg (204 lb)        Anesthesia Evaluation   Pt has had prior anesthetic.     History of anesthetic complications  - spinal headache.      ROS/MED HX  ENT/Pulmonary:  - neg pulmonary ROS     Neurologic:  - neg neurologic ROS     Cardiovascular:  - neg cardiovascular ROS     METS/Exercise Tolerance:     Hematologic:  - neg hematologic  ROS     Musculoskeletal:  - neg musculoskeletal ROS     GI/Hepatic:  - neg GI/hepatic ROS     Renal/Genitourinary:  - neg Renal ROS     Endo:     (+) Obesity,     Psychiatric/Substance Use:  - neg psychiatric ROS     Infectious Disease:  - neg infectious disease ROS     Malignancy:       Other:            Physical Exam    Airway        Mallampati: II   TM distance: > 3 FB   Neck ROM: full   Mouth opening: > 3 cm    Respiratory Devices and Support         Dental  no notable dental history         Cardiovascular   cardiovascular exam normal          Pulmonary   pulmonary exam normal                 OUTSIDE LABS:  CBC:   Lab Results   Component Value Date    WBC 10.8 02/14/2020    HGB 12.5 07/11/2021    HGB 14.5 02/14/2020    HCT 45.4 02/14/2020     02/14/2020     BMP:   Lab Results   Component Value Date     02/14/2020    POTASSIUM 4.4 02/14/2020    CHLORIDE 104 02/14/2020    CO2 25 02/14/2020    BUN 11 02/14/2020    CR 0.56 02/14/2020     (H) 02/14/2020     COAGS: No results found for: PTT, INR, FIBR  POC: No results found for: BGM, HCG, HCGS  HEPATIC: No results found for: ALBUMIN, PROTTOTAL, ALT, AST, GGT, ALKPHOS, BILITOTAL, BILIDIRECT, ELVIN  OTHER:   Lab Results   Component Value Date    CIERRA 9.1 02/14/2020       Anesthesia Plan    ASA Status:  3     - Procedure: Procedure only, no anesthetic delivered      Anesthesia Type: Epidural.              Consents    Anesthesia Plan(s) and associated risks, benefits, and realistic alternatives discussed. Questions answered and patient/representative(s) expressed understanding.     - Discussed with:  Patient         Postoperative Care            Comments:      Continuous Labor Epidural: Indication is for labor pain. Following an discussion of the procedure, expectations, and risks and benefits (risks including, but not limited to, nerve damage, infection, bleeding/hematoma, spinal headache, partial or failed block), the patient appears to understand and consents to proceed. Questions were encouraged and answered.               Ky Watson MD

## 2021-07-12 NOTE — PLAN OF CARE
Data: Bre Saleh transferred to [facility] via wheelchair at 0140. Baby transferred via parent's arms.  Action: Receiving unit notified of transfer: Yes. Patient and family notified of room change. Report given to Carlotta at 0130. Belongings sent to receiving unit. Accompanied by Registered Nurse. Oriented patient to surroundings. Call light within reach. ID bands double-checked with receiving RN.  Response: Patient tolerated transfer and is stable.

## 2021-07-12 NOTE — ANESTHESIA POSTPROCEDURE EVALUATION
Patient: Bre Saleh    * No procedures listed *    Diagnosis:* No pre-op diagnosis entered *  Diagnosis Additional Information: No value filed.    Anesthesia Type:  Epidural    Note:     Postop Pain Control: Uneventful            Sign Out: Well controlled pain   PONV: No   Neuro/Psych: Uneventful            Sign Out: Acceptable/Baseline neuro status   Airway/Respiratory: Uneventful            Sign Out: Acceptable/Baseline resp. status   CV/Hemodynamics: Uneventful            Sign Out: Acceptable CV status; No obvious hypovolemia; No obvious fluid overload   Other NRE: NONE   DID A NON-ROUTINE EVENT OCCUR? No    Event details/Postop Comments:  .Labor Epidural Post delivery note.      Doing well. VSS Temp normal. Satisfactory respiratory and cardiovascular function. Return of neurologic function. Questions encouraged and answered. Denies positional headache. Minimal side effects easily managed w/ PRN meds. No apparent anesthetic complications. No follow-up required.    I or my partner was immediately available for epidural management.    KARLA Bianchi             Last vitals:  Vitals:    07/12/21 0030 07/12/21 0045 07/12/21 0100   BP: 111/58 109/63 119/74   Pulse:      Resp: 16  16   Temp:      SpO2:          Last vitals prior to Anesthesia Care Transfer:      Electronically Signed By: Arthur Bianchi DO  July 12, 2021  7:57 AM

## 2021-07-12 NOTE — PROGRESS NOTES
[facility]   Post-Partum Note    Name:  Bre Saleh  MRN: [id number]    S: Patient is feeling ok. Having some lower abdominal cramping, worse with breastfeeding. Controlling pain with ibuprofen. NPO since this AM but previously was tolerating regular diet without nausea or vomiting.  Ambulating without dizziness.  Lochia less than a typical period.  Breastfeeding.  Certain about decision to pursue permanent sterilization.     Past Medical History:   Diagnosis Date     Miscarriage      Past Surgical History:   Procedure Laterality Date     DILATION AND CURETTAGE SUCTION WITH ULTRASOUND GUIDANCE N/A 10/10/2019    Procedure: Suction dilation and curettage with ultrasound;  Surgeon: Carolee Smyth DO;  Location: [facility] OR     GYN SURGERY  2015    surgery for ectopic pregnancy     SALPINGECTOMY Left 2014     History reviewed. No pertinent family history.       Allergies   Allergen Reactions     Chicken-Derived Products (Egg) Other (See Comments) and Swelling     Sleepy  Sleepy  Patient reports egg reaction only during childhood, has been tolerating and eating eggs, baked goods containing eggs since.       O:   Patient Vitals for the past 24 hrs:   BP Temp Temp src Pulse Resp SpO2   07/12/21 1300 (P) 125/81 -- -- -- (P) 16 --   07/12/21 0804 119/70 98.3  F (36.8  C) Oral 63 18 --   07/12/21 0100 119/74 -- -- -- 16 --   07/12/21 0045 109/63 -- -- -- -- --   07/12/21 0030 111/58 -- -- -- 16 --   07/12/21 0015 120/58 -- -- -- -- --   07/12/21 0000 118/59 -- -- -- 16 --   07/11/21 2345 110/59 -- -- -- 16 --   07/11/21 2330 110/54 -- -- -- 16 --   07/11/21 2315 116/58 -- -- -- 16 --   07/11/21 2300 115/57 98.2  F (36.8  C) Oral -- 18 --   07/11/21 2242 127/69 -- -- 86 -- --   07/11/21 2232 123/67 -- -- 88 -- 100 %   07/11/21 2216 121/58 -- -- 90 -- 100 %   07/11/21 2210 103/64 -- -- 81 -- 100 %   07/11/21 2205 107/65 -- -- 90 -- 100 %   07/11/21 2200 107/59 -- -- 98 18 100 %   07/11/21 2155  105/57 -- -- 89 -- 95 %   21 112/66 -- -- 82 -- 95 %   21 92/54 -- -- 87 -- 99 %   21 112/62 -- -- 104 16 97 %   21 103/65 -- -- 108 -- --   21 115/56 -- -- 99 -- 97 %   21 92/53 -- -- 102 -- --   21 (!) 86/51 -- -- 111 -- --   21 94/54 -- -- 95 16 --   21 (!) 87/54 -- -- 107 -- 97 %   21 (!) 86/54 -- -- 112 16 --   21 102/57 -- -- -- -- 98 %   21 98/54 -- -- 107 18 98 %   21 (!) 89/49 -- -- 103 -- 98 %   21 91/52 -- -- 109 -- 98 %   21 (!) 84/45 -- -- 116 18 --   21 (!) 78/40 -- -- 114 -- 100 %   21 91/46 -- -- 102 18 100 %   21 92/52 -- -- 109 -- 100 %   21 91/54 -- -- 101 18 100 %   21 101/50 -- -- -- -- --   21 115/65 -- -- 99 -- 98 %   21 -- -- -- -- -- 98 %   21 2100 133/70 -- -- 95 18 98 %   21 1936 106/53 98.1  F (36.7  C) Oral 78 18 96 %   21 1500 130/81 98.6  F (37  C) -- -- 16 --     Gen:  Resting comfortably, NAD  CV:  Regular rate and rhythm  Pulm:  Breathing comfortably on room air   Abd:  Soft, non-distended. Fundus at 3 cm below umbilicus, firm and non-tender.  Ext:  non-tender, no LE edema b/l    Hgb: 11.2 this AM     Assessment/Plan:  36 year old  on PPD #1 s/p  at 2246 on . Pregnancy notable for history of a left partial salpingectomy, AMA, obesity,  and a right ovarian cyst.    - proceed with bilateral postpartum salpingectomy, possible right ovarian cystectomy today. No contraindications to surgery. Discussed risks of infection, bleeding, injury to other organs, incomplete resection of tubes, tubal ligation failure and risks of anesthesia. Ok with a blood transfusion if needed. Written consent signed. Medically cleared for surgery.     Pain: Well-controlled with ibuprofen and tylenol  Hgb: 12.5>QBL  96>11.2  Rh: positive  Rubella:  immune  Feed: Breast  BC: Desires salpingectomy, FTP signed 4/2/21  Dispo: Plan for home tomorrow    Leah Henke, MD [facility]  07/12/21

## 2021-07-12 NOTE — ANESTHESIA PROCEDURE NOTES
Epidural catheter Procedure Note  Pre-Procedure   Staff -        Anesthesiologist:  Ky Watson MD       Performed By: anesthesiologist       Location: OB       Pre-Anesthestic Checklist: patient identified, IV checked, risks and benefits discussed, informed consent, monitors and equipment checked, pre-op evaluation and at physician/surgeon's request  Timeout:       Correct Patient: Yes        Correct Procedure: Yes        Correct Site: Yes        Correct Position: Yes   Procedure Documentation  Procedure: epidural catheter       Diagnosis: labor       Patient Position: sitting       Patient Prep/Sterile Barriers: sterile gloves, mask, patient draped       Skin prep: Betadine       Local skin infiltrated with 3 mL of 1% lidocaine.        Insertion Site: L3-4. (midline approach).       Technique: LORT saline        MILIND at 5 cm.       Needle Type: [employer] needle       Needle Gauge: 17.        Needle Length (Inches): 3.5        Catheter: 19 G.         Catheter threaded easily.         Threaded 10 cm at skin.         # of attempts: 1 and  # of redirects:  0    Assessment/Narrative         Paresthesias: No.      Test dose of 3 mL lidocaine 1.5% w/ 1:200,000 epinephrine at.         Test dose negative, 3 minutes after injection, for signs of intravascular, subdural, or intrathecal injection.       Insertion/Infusion Method: LORT saline       Aspiration negative for Heme or CSF via Epidural Catheter.    Comments:    Procedure tolerated well without apparent complications. Initial bolus of 0.25% bupivacaine given. Epidural infusion (0.125% bupi with fentanyl 2mcg/ml) started at 10 ml/hr with PCEA of 5 ml q15min with a max cumulative dose of 20 ml/hr. PCEA instructions given and use encouraged PRN. Epidural expectations reviewed and questions answered. Patient hemodynamically stable.  Patient and epidural functionality to be reassessed later via vital sign flowsheet, nursing communication, and/or patient report.   Anesthesiologist immediately available for management.

## 2021-07-12 NOTE — PROGRESS NOTES
Patient feeling pressure with contractions.    FHT:  145/mod/+accel/-decel  Cheraw: q 3-4 min  SVE: 8/90/-1  AROM'ed with thick mec. Head well applied to cervix with no cord prolapse.    ASSESSMENT and PLAN:  36 year old yo  at 38+5 with spontaneous labor  - Labor: AROM'ed with thick mec.    - FHT: cat I.  Cont CEFM  - AMA: genetics declined  - Right ovarian cyst: asymptomatic.  Eval with PP BTL if possible; otherwise, f/u PP  - Obesity  - Declined Tdap  - O pos  - GBS neg  - h/o  x2, pelvis proven to 6# 6oz  - PP contraception: desires permanent sterilization (papers signed 21).  Has h/o left salpingectomy; would require right salpingectomy only.  Aware that this may not be able to be done during her admission.  - Anticipate

## 2021-07-12 NOTE — ANESTHESIA POSTPROCEDURE EVALUATION
Patient: Bre Saleh    Procedure(s):  LIGATION, FALLOPIAN TUBE, POSTPARTUM Right ovarian cystectomy    Diagnosis:Request for sterilization [Z30.2]  Diagnosis Additional Information: No value filed.    Anesthesia Type:  Epidural    Note:  Disposition: Inpatient   Postop Pain Control: Uneventful            Sign Out: Well controlled pain   PONV: No   Neuro/Psych: Uneventful            Sign Out: Acceptable/Baseline neuro status   Airway/Respiratory: Uneventful            Sign Out: Acceptable/Baseline resp. status   CV/Hemodynamics: Uneventful            Sign Out: Acceptable CV status; No obvious hypovolemia; No obvious fluid overload   Other NRE: NONE   DID A NON-ROUTINE EVENT OCCUR? No           Last vitals:  Vitals:    07/12/21 1300 07/12/21 1426 07/12/21 1430   BP: 125/81 97/60 96/63   Pulse: 68 63 79   Resp: 16 16 14   Temp: 98.2  F (36.8  C) 96.1  F (35.6  C)    SpO2: 98% 99% 96%       Last vitals prior to Anesthesia Care Transfer:  CRNA VITALS  7/12/2021 1358 - 7/12/2021 1441      7/12/2021             SpO2:  95 %          Electronically Signed By: Devin Lima MD  July 12, 2021  2:41 PM

## 2021-07-12 NOTE — PROVIDER NOTIFICATION
07/11/21 2156   Provider Notification   Provider Name/Title Dr Dupont   Method of Notification In Department   Request Evaluate - Remote   Notification Reason SVE   Dr Dupont advised of SVE findings

## 2021-07-12 NOTE — PLAN OF CARE
Patient vitally stable, voiding without issue, sips of water with meds, plan for tubal ligation at 1400 today. IV SL, epidural catheter in place. Postpartum checks WDL. Pain adequately managed with prn Ibuprofen and Tylenol. Breastfeeding well. Plan for friend to arrive to stay with infant in room while in surgery. SW consult pending.

## 2021-07-12 NOTE — PROGRESS NOTES
[facility]  Brief Post-op Note    POD# 0  Procedure: postpartum bilateral salpingectomy and right ovarian cystectomy    Subjective:   Patient is feeling ok. Having some pain around the incision, using PO pain medications. Breastfeeding baby. Has voided. Eating regular diet.    Objective:   Vitals:    07/12/21 1525 07/12/21 1549 07/12/21 1619 07/12/21 1716   BP: 113/73 120/79 118/77 120/78   Pulse: 66 56 56 66   Resp: 8 16 18 18   Temp:  97.6  F (36.4  C) 97.6  F (36.4  C) 98.5  F (36.9  C)   TempSrc:  Oral Oral Oral   SpO2: 95% 98% 99% 98%     EXAM:   General: appears comfortable, breastfeeding  CV: Regular rate  Resp: Breathing comfortably on room air  Abdomen: soft, nontender, nondistended  Incision: clean, dry, intact, steristrips and bandage in place    Assessment: Bre Saleh is a 36 year old female POD#1 s/p postpartum BS and right cystectomy.     - routine postpartum cares  - tylenol, ibuprofen and roxicodone available for pain  - Hgb in AM  - anticipate discharge tomorrow    Leah C. Henke, MD [facility]

## 2021-07-12 NOTE — DISCHARGE SUMMARY
[facility] Discharge Summary    Bre Saleh MRN# [id number]   Age: 36 year old YOB: 1985     Date of Admission:  2021  Date of Discharge:  2021 11:34 AM  Admitting Physician:  Whit Weber MD  Discharge Physician:  Wood Shrestha MD    Admit Dx:   -  at 38w5d   - AMA  - Obesity  - Right ovarian cyst  - History of left salpingectomy  - Desire for sterilization     Discharge Dx:  - , s/p   - Same as above  - S/p postpartum bilateral salpingectomy and right ovarian cystectomy    Procedures:  - Spontaneous vaginal delivery  - Epidural analgesia  - Postpartum bilateral salpingectomy and right ovarian cystectomy    Admit HPI/Labor Course:  Bre Saleh is a 36 year old  who was admitted at 38w5d for spontaneous labor .  Pregnancy was complicated by AMA, obesity, R ovarian cyst.  She underwent labor augmentation with AROM.  AROM occurred notable for thick mec fluid. She progressed to fully and began pushing effectively. Pelvis was noted to be adequate.  She delivered a viable female infant with APGARs PENDING.  Head delivered in an KISHOR position, restituted to ROT and delivered without difficulty.  Left anterior shoulder delivered first, followed by right posterior shoulder without complication, and then rest of body. Spontaneous delivery of an intact placenta with a 3-V cord ensued shortly thereafter.  She received IV pitocin and methergine as a uterotonic agent.  Exam of the perineum revealed a 1st degree laceration, which was hemostatic and didn't need repair.  QBL 96cc.        Please see her Admission H&P and Delivery Summary for further details.    Postpartum Course:  Her postpartum course was uncomplicated. She underwent a bilateral salpingectomy and right ovarian cystectomy on PPD#1. EBL 5 mL and surgery was uncomplicated.  On PPD#2, she was meeting all of her postpartum goals and deemed stable for discharge. She was voiding without difficulty,  tolerating a regular diet without nausea and vomiting, her pain was well controlled on oral pain medicines and her lochia was appropriate. Her hemoglobin prior to delivery was 12.5 and after delivery was 11.2. Her Rh status was positive, and Rhogam was not indicated.     Discharge Medications:     Review of your medicines      CONTINUE these medicines which have NOT CHANGED      Dose / Directions   acetaminophen 325 MG tablet  Commonly known as: TYLENOL  Used for: S/P dilatation and curettage      Dose: 650 mg  Take 2 tablets (650 mg) by mouth every 4 hours as needed for mild pain  Quantity: 50 tablet  Refills: 0     ibuprofen 400 MG tablet  Commonly known as: ADVIL/MOTRIN      Dose: 400 mg  Take 400 mg by mouth every 6 hours as needed for moderate pain  Refills: 0     methocarbamol 500 MG tablet  Commonly known as: ROBAXIN      Dose: 500 mg  Take 1 tablet (500 mg) by mouth 4 times daily as needed for muscle spasms (neck pain)  Quantity: 20 tablet  Refills: 0     MULTIVITAMIN PO      Take by mouth daily  Refills: 0          Discharge/Disposition:  Bre Saleh was discharged to home in stable condition with the following instructions/medications:  1) Call for temperature > 100.4, bright red vaginal bleeding >1 pad an hour x 2 hours, foul smelling vaginal discharge, pain not controlled by usual oral pain meds, persistent nausea and vomiting not controlled on medications  2) For feeding she decided to breastfeed.  3) She was instructed to follow-up with her primary OB in 6 weeks for a routine postpartum visit.    Leah C. Henke, MD [facility]

## 2021-07-12 NOTE — OP NOTE
[facility]  Operative Note     Surgery Date:  2021  Surgeon:  Kaelyn Pike MD     Pre-op Diagnosis:  1.  s/p      2. Undesired fertility     3. History of left partial salpingectomy     4. Right ovarian cyst     Post-op Diagnosis:  1. Same as above    Procedure:  Postpartum bilateral salpingectomy, right ovarian cystectomy     Anesthesia: Epidural and local  EBL:  5 mL    Specimens:  Right fallopian tube, portion of left fallopian tube, right ovarian cyst  Complications: None     Indications:   Bre Saleh is a 36 year old  who desires permanent sterilization. She previously had a partial left salpingectomy for an ectopic pregnancy. Federal sterilization papers were signed 21. The risks, benefits, and alternatives of tubal sterilization and ovarian cystectomy were discussed with the patient, and she agreed to proceed.     Findings:   Normal uterine fundus. Right fallopian tube normal appearing. Right ovary with deflated ovarian cyst with a small amount of clear fluid. Left fallopian tube with evidence of prior partial salpingectomy. Left ovary not visualized.     Procedure Details:   The patient's epidural was bolused and she was taken to the operating room. IV sedation was given. A surgical timeout was performed. A small, curvilinear infra-umbicical skin incision was then made with the scalpel. The incision was carried down through the underlying subcutaneous tissue with cautery and blunt dissection until the fascia was identified. The fascia was grasped with a Kocher clamp, elevated and incised. The fascial incision was extended and the edge tagged with an 0-Vicryl. The peritoneum was identified and sharply entered. The peritoneum was noted to be free of any adhesions and the incision was extended with the Metzenbaum scissors. A small Richardson retractor was placed.     The patient's right fallopian tube was then identified, brought to the incision, and grasped with a  Tory clamp. The tube was then followed out to the fimbria. The right ovary was brought to the skin and the right ovarian cyst was excised with cautery and blunt dissection. The remaining right ovary was noted to be hemostatic. The right fallopian was then serially cauterized and cut using the Ligasure device until reaching the cornua where it was transected. The right fallopian tube and right ovarian cyst were passed off to pathology. The mesosalpinx was inspected and noted to be hemostatic. Attention was turned to the left fallopian tube which was grasped with a Tory clamp at the uterine cornua and carried out to its truncated end at the site of prior salpingectomy. The Ligasure device was used to serially cauterize and cut underneath the right fallopian tube and remove it from the cornua. It was passed off to pathology and the mesosalpinx was noted to be hemostatic. The fascia was then closed in a single layer using 0-Vicryl. The skin was closed in subcuticular fashion with 3-0 Vicryl. The incision was injected with 10 ml of 0.25% bupivacaine with epinephrine.     The patient tolerated the procedure well. Sponge, lap, and needle counts were correct times two. The patient was taken to the recovery room in stable condition.    Kaelyn Pike MD

## 2021-07-12 NOTE — ANESTHESIA CARE TRANSFER NOTE
Patient: Bre Saleh    Procedure(s):  LIGATION, FALLOPIAN TUBE, POSTPARTUM Right ovarian cystectomy    Diagnosis: Request for sterilization [Z30.2]  Diagnosis Additional Information: No value filed.    Anesthesia Type:   Epidural     Note:    Oropharynx: oropharynx clear of all foreign objects  Level of Consciousness: drowsy  Oxygen Supplementation: face mask  Level of Supplemental Oxygen (L/min / FiO2): 6  Independent Airway: airway patency satisfactory and stable  Dentition: dentition unchanged  Vital Signs Stable: post-procedure vital signs reviewed and stable  Report to RN Given: handoff report given  Patient transferred to: PACU    Handoff Report: Identifed the Patient, Identified the Reponsible Provider, Reviewed the pertinent medical history, Discussed the surgical course, Reviewed Intra-OP anesthesia mangement and issues during anesthesia, Set expectations for post-procedure period and Allowed opportunity for questions and acknowledgement of understanding      Vitals: (Last set prior to Anesthesia Care Transfer)  CRNA VITALS  7/12/2021 1358 - 7/12/2021 1430      7/12/2021             SpO2:  95 %        Electronically Signed By: LARRY Goss CRNA  July 12, 2021  2:30 PM

## 2021-07-12 NOTE — ANESTHESIA PREPROCEDURE EVALUATION
Anesthesia Pre-Procedure Evaluation    Patient: Bre Saleh   MRN: [id number] : 1985        Preoperative Diagnosis: Request for sterilization [Z30.2]   Procedure : Procedure(s):  LIGATION, FALLOPIAN TUBE, POSTPARTUM     Past Medical History:   Diagnosis Date     Miscarriage       Past Surgical History:   Procedure Laterality Date     DILATION AND CURETTAGE SUCTION WITH ULTRASOUND GUIDANCE N/A 10/10/2019    Procedure: Suction dilation and curettage with ultrasound;  Surgeon: Carolee Smyth DO;  Location: [facility] OR     GYN SURGERY  2015    surgery for ectopic pregnancy     SALPINGECTOMY Left 2014      Allergies   Allergen Reactions     Chicken-Derived Products (Egg) Other (See Comments) and Swelling     Sleepy  Sleepy  Patient reports egg reaction only during childhood, has been tolerating and eating eggs, baked goods containing eggs since.        Social History     Tobacco Use     Smoking status: Never Smoker     Smokeless tobacco: Never Used   Substance Use Topics     Alcohol use: No      Wt Readings from Last 1 Encounters:   20 92.5 kg (204 lb)        Anesthesia Evaluation   Pt has had prior anesthetic. Type: Regional.    No history of anesthetic complications       ROS/MED HX  ENT/Pulmonary:  - neg pulmonary ROS     Neurologic:  - neg neurologic ROS     Cardiovascular:  - neg cardiovascular ROS     METS/Exercise Tolerance:     Hematologic:  - neg hematologic  ROS     Musculoskeletal:  - neg musculoskeletal ROS     GI/Hepatic:     (+) GERD,     Renal/Genitourinary:  - neg Renal ROS     Endo:  - neg endo ROS   (+) Obesity,     Psychiatric/Substance Use:  - neg psychiatric ROS     Infectious Disease:  - neg infectious disease ROS     Malignancy:  - neg malignancy ROS     Other:            Physical Exam    Airway        Mallampati: II   TM distance: > 3 FB   Neck ROM: full   Mouth opening: > 3 cm    Respiratory Devices and Support         Dental           Cardiovascular   cardiovascular exam  normal          Pulmonary   pulmonary exam normal            Other findings: Lab Test        07/12/21 07/11/21 02/14/20                       1053          1644          1752          WBC           --           --          10.8          HGB          11.2*        12.5         14.5          MCV           --           --          90            PLT           --           --          264            Lab Test        02/14/20                       1752          NA           135           POTASSIUM    4.4           CHLORIDE     104           CO2          25            BUN          11            CR           0.56          ANIONGAP     6             CIERRA          9.1           GLC          129*            OUTSIDE LABS:  CBC:   Lab Results   Component Value Date    WBC 10.8 02/14/2020    HGB 11.2 (L) 07/12/2021    HGB 12.5 07/11/2021    HCT 45.4 02/14/2020     02/14/2020     BMP:   Lab Results   Component Value Date     02/14/2020    POTASSIUM 4.4 02/14/2020    CHLORIDE 104 02/14/2020    CO2 25 02/14/2020    BUN 11 02/14/2020    CR 0.56 02/14/2020     (H) 02/14/2020     COAGS: No results found for: PTT, INR, FIBR  POC: No results found for: BGM, HCG, HCGS  HEPATIC: No results found for: ALBUMIN, PROTTOTAL, ALT, AST, GGT, ALKPHOS, BILITOTAL, BILIDIRECT, ELVIN  OTHER:   Lab Results   Component Value Date    CIERRA 9.1 02/14/2020       Anesthesia Plan    ASA Status:  2      Anesthesia Type: Epidural.              Consents    Anesthesia Plan(s) and associated risks, benefits, and realistic alternatives discussed. Questions answered and patient/representative(s) expressed understanding.     - Discussed with:  Patient      - Extended Intubation/Ventilatory Support Discussed: No.      - Patient is DNR/DNI Status: No    Use of blood products discussed: No .     Postoperative Care    Pain management: IV analgesics, Oral pain medications.   PONV prophylaxis: Ondansetron (or other 5HT-3)     Comments:    GA via ETT  prn.            Devin Lima MD

## 2021-07-12 NOTE — PLAN OF CARE
Patient up adlib, voiding without difficulty, VS stable. Pain managed with ibuprofen. Breastfeeding going well. Bonding well with infant and independent with cares. Epidural in place for tubal ligation this afternoon. Friend in room and supportive.

## 2021-07-12 NOTE — PROVIDER NOTIFICATION
07/11/21 2150   Provider Notification   Provider Name/Title Dr Dupont   Method of Notification In Department   Notification Reason Fetal Baseline Change;Other (Comment)   Dr Dupont updated - epidural infusing, drop in maternal BP, treated x 5 thus far.  FHR baseline change, variability decreased.  Fluid bolus running.  Maternal position changes.  Hayley Felix RN on 7/11/2021 at 9:53 PM

## 2021-07-12 NOTE — L&D DELIVERY NOTE
Bre Saleh is a 36 year old  who was admitted at 38w5d for spontaneous labor .  Pregnancy was complicated by AMA, obesity, R ovarian cyst.  She underwent labor augmentation with AROM.  AROM occurred notable for thick mec fluid. She progressed to fully and began pushing effectively. Pelvis was noted to be adequate.  She delivered a viable female infant with APGARs PENDING.  Head delivered in an KISHOR position, restituted to ROT and delivered without difficulty.  Left anterior shoulder delivered first, followed by right posterior shoulder without complication, and then rest of body. Spontaneous delivery of an intact placenta with a 3-V cord ensued shortly thereafter.  She received IV pitocin and methergine as a uterotonic agent.  Exam of the perineum revealed a 1st degree laceration, which was hemostatic and didn't need repair.  QBL 96cc.      She would still like to proceed with permanent sterilization.  She has a h/o left salpingecotmy, so procedure would be right salpingectomy.  She is aware that this may not be able to be done tomorrow.  Coordinated with OR, she tentatively is scheduled for 1430/1500 tomorrow.     Lisa Dupont MD   2021, 11:03 PM     Delivery Summary    Bre Saleh MRN# [id number]   Age: 36 year old YOB: 1985          Mayuri Saleh-Bre [[id number]]    Labor Event Times    Labor onset date: 21 Onset time: 11:00 AM   Dilation complete date: 21 Complete time: 10:27 PM   Start pushing date/time: 2021 2234      Labor Events     labor?: No     Rupture date/time: 21   Rupture type: Artificial Rupture of Membranes  Fluid color: Meconium  Fluid odor: Normal     Augmentation: AROM     Delivery/Placenta Date and Time    Delivery Date: 21 Delivery Time: 10:46 PM   Placenta Date/Time: 2021 10:49 PM  Oxytocin given at the time of delivery: after delivery of placenta  Delivering clinician: Lisa Dupont MD   Other personnel  present at delivery:  Provider Role   Hayley Felix RN Registered Nurse   Heydi Bhandari RN Registered Nurse         Vaginal Counts     Initial count performed by 2 team members:  Two Team Members   Hayley Dupont       Needles Suture Needles Sponges (RETIRED) Instruments   Initial counts 2  5    Added to count       Relief counts       Final counts 2  5          Placed during labor Accounted for at the end of labor   FSE Yes Yes   IUPC No NA   Cervadil No NA              Final count performed by 2 team members:  Two Team Members   Hayley Dupont      Final count correct?: Yes     Cord    Vessels: 3 Vessels    Cord Complications: None               Cord Blood Disposition: Lab    Gases Sent?: No    Delayed cord clamping?: Yes    Stem cell collection?: No       Labor Events and Shoulder Dystocia    Fetal Tracing Prior to Delivery: Category 1  Shoulder dystocia present?: Neg     Delivery (Maternal) (Provider to Complete) ([id number])    Episiotomy: None  Perineal lacerations: 1st Repaired?: No   Repair suture: None  Genital tract inspection done: Pos     Blood Loss  Mother: Bre Saleh #[id number]   Start of Mother's Information    Delivery Blood Loss  07/11/21 1100 - 07/11/21 2303    Delivery QBL (mL) Hospital Encounter 96 mL    Total  96 mL         End of Mother's Information  Mother: Bre Saleh #[id number]          Delivery - Provider to Complete ([id number])    Delivering clinician: Lisa Dupont MD  Attempted Delivery Types (Choose all that apply): Spontaneous Vaginal Delivery  Delivery Type (Choose the 1 that will go to the Birth History): Vaginal, Spontaneous                   Other personnel:  Provider Role   Hayley Felix RN Registered Nurse   Heydi Bhandari RN Registered Nurse                Placenta    Date/Time: 7/11/2021 10:49 PM  Removal: Spontaneous  Disposition: Hospital disposal           Anesthesia    Method: Epidural                Presentation and Position    Presentation:  Vertex    Position: Right Occiput Anterior                 Lisa Dupont MD

## 2021-07-12 NOTE — LACTATION NOTE
This note was copied from a baby's chart.  Lactation visit. This is Bre's third child, she reports nursing her previous two children with no concerns. Writer assisted with widening infant's latch on R breast, nutritive suck pattern noted. Bre has no questions about feeding at this time, discussed lactation resources available while she is here.

## 2021-07-13 VITALS
TEMPERATURE: 98.4 F | OXYGEN SATURATION: 95 % | DIASTOLIC BLOOD PRESSURE: 68 MMHG | HEART RATE: 70 BPM | SYSTOLIC BLOOD PRESSURE: 109 MMHG | RESPIRATION RATE: 18 BRPM

## 2021-07-13 LAB — HGB BLD-MCNC: 10.7 G/DL (ref 11.7–15.7)

## 2021-07-13 PROCEDURE — 85018 HEMOGLOBIN: CPT | Performed by: STUDENT IN AN ORGANIZED HEALTH CARE EDUCATION/TRAINING PROGRAM

## 2021-07-13 PROCEDURE — 250N000013 HC RX MED GY IP 250 OP 250 PS 637: Performed by: OBSTETRICS & GYNECOLOGY

## 2021-07-13 PROCEDURE — 36415 COLL VENOUS BLD VENIPUNCTURE: CPT | Performed by: STUDENT IN AN ORGANIZED HEALTH CARE EDUCATION/TRAINING PROGRAM

## 2021-07-13 RX ADMIN — IBUPROFEN 800 MG: 800 TABLET, FILM COATED ORAL at 10:17

## 2021-07-13 RX ADMIN — ACETAMINOPHEN 650 MG: 325 TABLET, FILM COATED ORAL at 07:22

## 2021-07-13 RX ADMIN — ACETAMINOPHEN 650 MG: 325 TABLET, FILM COATED ORAL at 03:20

## 2021-07-13 RX ADMIN — IBUPROFEN 800 MG: 800 TABLET, FILM COATED ORAL at 04:32

## 2021-07-13 NOTE — PLAN OF CARE
"Writer brought patient Tylenol for pain control. When writer entered room, patient had been sleeping with infant in the bed. Patient asked if staff could place infant in the swaddler and back into the bassinet. Educated patient on the ABCs of safe sleep and the risks of not following safe sleep practices. Patient explained, \"I get that, but my stomach muscles are weak right now making it hard to pick her up, I have pillows surrounding me.\" Explained again to patient that we practice safe sleeping here in the hospital and that staff is available to come into the room and help with lifting infant. Patient explained, \"she is in a good place right now and close to my boob.\" Explained again that if she will be sleeping, that infant should be placed in the bassinet. Patient continued to refuse placing infant back in bassinet. Patient awake, holding infant, and turned TV on when writer left the room.  "

## 2021-07-13 NOTE — PROGRESS NOTES
[facility] Obstetrics Post-Partum Progress Note          Assessment and Plan:    Assessment:   Post-partum day #2  Normal spontaneous vaginal delivery  L&D complications: None      Doing well.  Clean wound without signs of infection.  Normal healing wound.  No immediate surgical complications identified.  No excessive bleeding  Pain well-controlled.      Plan:   Discharge later today           Interval History:   Doing well.  Pain is adequately controlled.  No fevers.  No history of foul-smelling vaginal discharge.  Good appetite.  Denies chest pain, shortness of breath, nausea or vomiting.  Vaginal bleeding is similar to a heavy menstrual flow.  Breastfeeding well.          Significant Problems:    None          Review of Systems:    The patient denies any chest pain, shortness of breath, excessive pain, fever, chills, purulent drainage from the wound, nausea or vomiting.          Medications:   All medications related to the patient's surgery have been reviewed          Physical Exam:   All vitals stable  Uterine fundus is firm, non-tender and at the level of the umbilicus          Data:   All laboratory data related to this surgery reviewed      Anurag Shrestha

## 2021-07-13 NOTE — PLAN OF CARE
VSS. Up ad hugh. Breastfeeding and tolerating well. Scant amount of lochia, no clots noted. Incision from tubal covered with steri strips and bandaid, dried drainage, otherwise CDI. Tylenol and Ibuprofen for pain control. BS audible/active x4, tolerating PO, denies N/V. Voiding. Bonding well with infant, attentive to cares. Support person at bedside most of the night and supportive. Continue with plan of care.

## 2021-07-13 NOTE — PROVIDER NOTIFICATION
07/13/21 1055   Provider Notification   Provider Name/Title Dr. Shrestha   Method of Notification Phone   Request Evaluate-Remote   Notification Reason Other   MD updated that Hgb was redrawn at approximately 1030 (should have been drawn at 0600), prior hgb stable at 11.2 (7/12). Md okay with discharge patient without hgb results on 7/13 as blood loss was only 96. Patient stable.    Carolee Ray, RN

## 2021-07-13 NOTE — PROVIDER NOTIFICATION
07/13/21 1054   Provider Notification   Provider Name/Title Dr. Shrestha   Method of Notification Phone   Request Evaluate-Remote   Notification Reason Lab Results   Md paged, awaiting response

## 2021-07-13 NOTE — PLAN OF CARE
Assumed care at 1900: Up ad hugh. Breastfeeding and tolerating well. Independent with self and infant cares. Voiding. Tylenol and Ibuprofen for pain control. Bonding well with infant, attentive to cares. Friend at bedside and supportive. Continue with plan of care.

## 2021-07-13 NOTE — PLAN OF CARE
Pt up and hugh. Voiding without difficulty. Fundus lightly touched, as patient has had tubal completed yesterday. Steri-strips reapplied. Tylenol and ibuprofen effective for pain management. Breastfeeding with minimal assistance, on demand feedings. IV discontinued. PPD and BC completed. ROP given as FOB is not currently present. Videos watched. Breast pump given per patient desire. MD recommends follow-up in 6 weeks. Education completed. Discharge instructions completed and all questions and concerns answered. Discharged with family in wheelchair at 1135.    Carolee Ray RN

## 2021-07-14 PROBLEM — Z34.90 PREGNANT: Status: RESOLVED | Noted: 2017-01-10 | Resolved: 2017-01-11

## 2021-07-14 PROBLEM — Z37.9 NORMAL LABOR: Status: RESOLVED | Noted: 2017-01-10 | Resolved: 2017-01-11

## 2021-07-15 LAB
PATH REPORT.COMMENTS IMP SPEC: NORMAL
PATH REPORT.COMMENTS IMP SPEC: NORMAL
PATH REPORT.FINAL DX SPEC: NORMAL
PATH REPORT.GROSS SPEC: NORMAL
PATH REPORT.MICROSCOPIC SPEC OTHER STN: NORMAL
PATH REPORT.RELEVANT HX SPEC: NORMAL
PHOTO IMAGE: NORMAL

## 2021-10-09 ENCOUNTER — HEALTH MAINTENANCE LETTER (OUTPATIENT)
Age: 36
End: 2021-10-09

## 2022-05-16 ENCOUNTER — HEALTH MAINTENANCE LETTER (OUTPATIENT)
Age: 37
End: 2022-05-16

## 2022-09-11 ENCOUNTER — HEALTH MAINTENANCE LETTER (OUTPATIENT)
Age: 37
End: 2022-09-11

## 2023-06-03 ENCOUNTER — HEALTH MAINTENANCE LETTER (OUTPATIENT)
Age: 38
End: 2023-06-03

## 2024-07-13 ENCOUNTER — HEALTH MAINTENANCE LETTER (OUTPATIENT)
Age: 39
End: 2024-07-13

## (undated) DEVICE — DRSG TELFA 2X3"

## (undated) DEVICE — FILTER BERKLEY SAFETOUCH

## (undated) DEVICE — PAD CHUX UNDERPAD 30X36" P3036C

## (undated) DEVICE — SU MONOCRYL 3-0 PS-2 27" Y427H

## (undated) DEVICE — BAG CLEAR TRASH 1.3M 39X33" P4040C

## (undated) DEVICE — SPECIMEN TRAP VACUUM SUCTION 003984-901

## (undated) DEVICE — GLOVE PROTEXIS BLUE W/NEU-THERA 6.0  2D73EB60

## (undated) DEVICE — Device

## (undated) DEVICE — ESU GROUND PAD ADULT W/CORD E7507

## (undated) DEVICE — SU VICRYL 0 CT 36" J358H

## (undated) DEVICE — PACK MINOR LITHOTOMY RIDGES

## (undated) DEVICE — LINEN HALF SHEET 5512

## (undated) DEVICE — BLADE KNIFE SURG 10 371110

## (undated) DEVICE — LINEN TOWEL PACK X10 5473

## (undated) DEVICE — TUBING SUCTION VACUUM COLLECTION 6FT 610

## (undated) DEVICE — LINEN FULL SHEET 5511

## (undated) DEVICE — DRAPE LAP W/ARMBOARD 29410

## (undated) DEVICE — DRSG STERI STRIP 1/2X4" R1547

## (undated) DEVICE — SUCTION TIP YANKAUER W/O VENT K86

## (undated) DEVICE — GLOVE PROTEXIS POWDER FREE 7.5 ORTHOPEDIC 2D73ET75

## (undated) DEVICE — GLOVE PROTEXIS W/NEU-THERA 6.0  2D73TE60

## (undated) DEVICE — PACK MINOR CUSTOM RIDGES SBA32RMRMA

## (undated) DEVICE — GLOVE PROTEXIS W/NEU-THERA 7.5  2D73TE75

## (undated) DEVICE — TUBING SUCTION MEDI-VAC SOFT 3/16"X20' N520A

## (undated) DEVICE — LINEN TOWEL PACK X5 5464

## (undated) DEVICE — SOL NACL 0.9% IRRIG 1000ML BOTTLE 2F7124

## (undated) DEVICE — SUCTION CANNULA UTERINE 10MM CVD  21553

## (undated) RX ORDER — FENTANYL CITRATE 50 UG/ML
INJECTION, SOLUTION INTRAMUSCULAR; INTRAVENOUS
Status: DISPENSED
Start: 2019-10-10

## (undated) RX ORDER — ONDANSETRON 2 MG/ML
INJECTION INTRAMUSCULAR; INTRAVENOUS
Status: DISPENSED
Start: 2021-07-12

## (undated) RX ORDER — LIDOCAINE HYDROCHLORIDE 10 MG/ML
INJECTION, SOLUTION EPIDURAL; INFILTRATION; INTRACAUDAL; PERINEURAL
Status: DISPENSED
Start: 2021-07-12

## (undated) RX ORDER — FENTANYL CITRATE 50 UG/ML
INJECTION, SOLUTION INTRAMUSCULAR; INTRAVENOUS
Status: DISPENSED
Start: 2021-07-12

## (undated) RX ORDER — KETOROLAC TROMETHAMINE 30 MG/ML
INJECTION, SOLUTION INTRAMUSCULAR; INTRAVENOUS
Status: DISPENSED
Start: 2019-10-10

## (undated) RX ORDER — LIDOCAINE HYDROCHLORIDE 10 MG/ML
INJECTION, SOLUTION EPIDURAL; INFILTRATION; INTRACAUDAL; PERINEURAL
Status: DISPENSED
Start: 2019-10-10

## (undated) RX ORDER — DEXAMETHASONE SODIUM PHOSPHATE 4 MG/ML
INJECTION, SOLUTION INTRA-ARTICULAR; INTRALESIONAL; INTRAMUSCULAR; INTRAVENOUS; SOFT TISSUE
Status: DISPENSED
Start: 2021-07-12

## (undated) RX ORDER — PROPOFOL 10 MG/ML
INJECTION, EMULSION INTRAVENOUS
Status: DISPENSED
Start: 2021-07-12

## (undated) RX ORDER — DEXAMETHASONE SODIUM PHOSPHATE 4 MG/ML
INJECTION, SOLUTION INTRA-ARTICULAR; INTRALESIONAL; INTRAMUSCULAR; INTRAVENOUS; SOFT TISSUE
Status: DISPENSED
Start: 2019-10-10

## (undated) RX ORDER — BUPIVACAINE HYDROCHLORIDE AND EPINEPHRINE 2.5; 5 MG/ML; UG/ML
INJECTION, SOLUTION EPIDURAL; INFILTRATION; INTRACAUDAL; PERINEURAL
Status: DISPENSED
Start: 2021-07-12

## (undated) RX ORDER — GLYCOPYRROLATE 0.2 MG/ML
INJECTION INTRAMUSCULAR; INTRAVENOUS
Status: DISPENSED
Start: 2019-10-10

## (undated) RX ORDER — DOXYCYCLINE 100 MG/10ML
INJECTION, POWDER, LYOPHILIZED, FOR SOLUTION INTRAVENOUS
Status: DISPENSED
Start: 2019-10-10

## (undated) RX ORDER — PROPOFOL 10 MG/ML
INJECTION, EMULSION INTRAVENOUS
Status: DISPENSED
Start: 2019-10-10

## (undated) RX ORDER — ONDANSETRON 2 MG/ML
INJECTION INTRAMUSCULAR; INTRAVENOUS
Status: DISPENSED
Start: 2019-10-10